# Patient Record
Sex: FEMALE | Race: WHITE | NOT HISPANIC OR LATINO | Employment: OTHER | URBAN - METROPOLITAN AREA
[De-identification: names, ages, dates, MRNs, and addresses within clinical notes are randomized per-mention and may not be internally consistent; named-entity substitution may affect disease eponyms.]

---

## 2017-04-10 ENCOUNTER — HOSPITAL ENCOUNTER (OUTPATIENT)
Dept: RADIOLOGY | Facility: HOSPITAL | Age: 62
Discharge: HOME | End: 2017-04-10
Attending: INTERNAL MEDICINE
Payer: MEDICAID

## 2017-04-10 DIAGNOSIS — Z12.31 OTHER SCREENING MAMMOGRAM: ICD-10-CM

## 2017-04-10 PROCEDURE — 77067 SCR MAMMO BI INCL CAD: CPT | Mod: 26,,, | Performed by: RADIOLOGY

## 2017-04-10 PROCEDURE — 77067 SCR MAMMO BI INCL CAD: CPT | Mod: TC

## 2017-04-25 ENCOUNTER — HOSPITAL ENCOUNTER (OUTPATIENT)
Dept: RADIOLOGY | Facility: HOSPITAL | Age: 62
Discharge: HOME | End: 2017-04-25
Attending: INTERNAL MEDICINE
Payer: MEDICAID

## 2017-04-25 DIAGNOSIS — R92.2 INCONCLUSIVE MAMMOGRAPHY: ICD-10-CM

## 2017-04-25 PROCEDURE — 77065 DX MAMMO INCL CAD UNI: CPT | Mod: 26,,, | Performed by: RADIOLOGY

## 2017-04-25 PROCEDURE — 77061 BREAST TOMOSYNTHESIS UNI: CPT | Mod: TC

## 2017-04-25 PROCEDURE — 76642 ULTRASOUND BREAST LIMITED: CPT | Mod: TC,RT

## 2017-04-25 PROCEDURE — 76642 ULTRASOUND BREAST LIMITED: CPT | Mod: 26,RT,, | Performed by: RADIOLOGY

## 2017-04-25 PROCEDURE — 77061 BREAST TOMOSYNTHESIS UNI: CPT | Mod: 26,,, | Performed by: RADIOLOGY

## 2018-06-27 ENCOUNTER — TELEPHONE (OUTPATIENT)
Dept: SURGERY | Facility: CLINIC | Age: 63
End: 2018-06-27

## 2018-06-27 NOTE — TELEPHONE ENCOUNTER
----- Message from Toya Mercedes sent at 6/26/2018  1:00 PM CDT -----  Contact: Pt:[phone]  .Needs Advice    Reason for call:Pt states she needs to schedule a one year follow up. Pt has [place] medicaid.       Communication Preference:Pt:[phone]  Additional Information:

## 2018-07-17 ENCOUNTER — OFFICE VISIT (OUTPATIENT)
Dept: HEMATOLOGY/ONCOLOGY | Facility: CLINIC | Age: 63
End: 2018-07-17
Payer: MEDICAID

## 2018-07-17 VITALS
BODY MASS INDEX: 32.19 KG/M2 | HEART RATE: 80 BPM | DIASTOLIC BLOOD PRESSURE: 82 MMHG | WEIGHT: 181.69 LBS | TEMPERATURE: 98 F | RESPIRATION RATE: 18 BRPM | SYSTOLIC BLOOD PRESSURE: 152 MMHG

## 2018-07-17 DIAGNOSIS — C20 RECTAL CANCER: ICD-10-CM

## 2018-07-17 DIAGNOSIS — R92.8 ABNORMAL MAMMOGRAM: ICD-10-CM

## 2018-07-17 DIAGNOSIS — Z85.048 PERSONAL HISTORY OF MALIGNANT NEOPLASM OF RECTUM, RECTOSIGMOID JUNCTION, AND ANUS: Primary | ICD-10-CM

## 2018-07-17 DIAGNOSIS — R92.8 ABNORMAL FINDING ON MAMMOGRAPHY: ICD-10-CM

## 2018-07-17 PROCEDURE — 99214 OFFICE O/P EST MOD 30 MIN: CPT | Mod: ,,, | Performed by: INTERNAL MEDICINE

## 2018-07-17 RX ORDER — GABAPENTIN 600 MG/1
800 TABLET ORAL 3 TIMES DAILY
COMMUNITY
End: 2019-07-09 | Stop reason: DRUGHIGH

## 2018-07-17 RX ORDER — PAROXETINE 10 MG/1
10 TABLET, FILM COATED ORAL EVERY MORNING
COMMUNITY
End: 2019-04-02

## 2018-07-17 NOTE — PROGRESS NOTES
[facility] History & Physical    Subjective:      Patient ID:   Kim Porter  62 y.o. female  1955  MD Colin Maria MD      Chief Complaint:   Rectal cancer history    HPI:  62 y.o. female with diagnosis of rectal cancer diagnosed and treated several years ago. She had radiation therapy and neoadjuvant chemotherapy initially. Thereafter she had low anterior resection with the placement of an ostomy. After 6 months approximately the ostomy was reversed. She did receive adjuvant FOLFOX chemotherapy ×6 months. She denies GI complaints at this time.    She does have peripheral neuropathy symptoms approximately [facility] up her left and right leg. This is probably secondary to previous chemotherapy, given during the treatment of her cancer.    She had a colonoscopy done in October 2017, cancer was not seen, she is due for a follow-up colonoscopy in 2019 per Dr. Larios.    She had cholecystectomy completed approximately 3 years ago and is due for a mammogram at this time.    ROS:   GEN: normal without any fever, night sweats or weight loss  HEENT: normal with no HA's, sore throat, stiff neck, changes in vision  CV: normal with no CP, SOB, PND, FLOYD or orthopnea  PULM: normal with no SOB, cough, hemoptysis, sputum or pleuritic pain  GI:   See history of present illness  : normal with no hematuria, dysuria  BREAST: normal with no mass, discharge, pain  SKIN: normal with no rash, erythema, bruising, or swelling     Past Medical History:   Diagnosis Date    Chronic kidney disease     stones    Colon cancer      Past Surgical History:   Procedure Laterality Date    HYSTERECTOMY      PORTACATH PLACEMENT  9/2012    TUBAL LIGATION         Review of patient's allergies indicates:   Allergen Reactions    Ciprofloxacin Nausea And Vomiting    Vicodin [hydrocodone-acetaminophen] Nausea And Vomiting     Social History     Social History    Marital status:      Spouse name: N/A    Number of  children: N/A    Years of education: N/A     Occupational History    Not on file.     Social History Main Topics    Smoking status: Current Every Day Smoker     Packs/day: 0.50     Years: 40.00    Smokeless tobacco: Never Used    Alcohol use No    Drug use: No    Sexual activity: Not Currently     Other Topics Concern    Not on file     Social History Narrative    No narrative on file         Current Outpatient Prescriptions:     gabapentin (NEURONTIN) 600 MG tablet, Take 600 mg by mouth 3 (three) times daily., Disp: , Rfl:     lisinopril-hydrochlorothiazide (PRINZIDE,ZESTORETIC) 20-25 mg Tab, Take 1 tablet by mouth once daily., Disp: , Rfl:     multivitamin (ONE DAILY MULTIVITAMIN) per tablet, Take 1 tablet by mouth once daily., Disp: , Rfl:     paroxetine (PAXIL) 10 MG tablet, Take 10 mg by mouth every morning., Disp: , Rfl:           Objective:   Vitals:  Blood pressure (!) 152/82, pulse 80, temperature 97.8 °F (36.6 °C), resp. rate 18, weight 82.4 kg (181 lb 11.2 oz).    Physical Examination:   GEN: no apparent distress, comfortable  HEAD: atraumatic and normocephalic  EYES: no pallor, no icterus  ENT: no pharyngeal erythema, external ears WNL; no nasal discharge  NECK: no masses, thyroid normal, trachea midline, no LAD/LN's, supple  CV: RRR with no murmur; normal pulse; normal S1 and S2; no pedal edema  CHEST: Normal respiratory effort; CTAB; normal breath sounds; no wheeze or crackles  ABDOM: nontender and nondistended; soft; normal bowel sounds; no rebound/guarding, liver and spleen not palpable  MUSC/Skeletal: ROM normal; no crepitus; joints normal; no deformities or arthropathy  EXTREM: no clubbing, cyanosis, inflammation or swelling  SKIN: no rashes, lesions, ulcers, petechiae or subcutaneous nodules  : no duff  NEURO: grossly intact; motor WNL; no tremors.  She has numbness at prison up the left and right leg  PSYCH: normal mood, affect and behavior  LYMPH: normal cervical,  supraclavicular, axillary and groin LN's  BREASTS:without palpable mass at the left or right breast    CBC, CMP, CEA, mammogram and CAT scan of abdomen and pelvis ordered at [facility]    Assessment:   (1) 62 y.o. female with diagnosis of Rectal cancer in the past, treated with neoadjuvant radiation and chemotherapy followed by surgery followed by adjuvant FOLFOX chemotherapy.    (2)she has peripheral neuropathy at the legs secondary to previous chemotherapy. She had previously been employed as a , , and worked at the local [employer]. Because of her chronic peripheral neuropathy symptoms, she cannot spend much time standing on her feet and cannot return to those occupations.    She will have the above blood work, mammogram and CAT scan of the abdomen and pelvis done at [facility] and follow-up here in one month. It approximates 5 years out since the time of her rectal cancer diagnosis.

## 2018-07-17 NOTE — LETTER
July 17, 2018      Colin Larios III, MD  [address]  [address]  [place] [address]           [facility] - Hematology Oncology  [address]  [address]  [place] [address]  Phone: [phone]  Fax: [phone]          Patient: Kim Porter   MR Number: [id number]   YOB: 1955   Date of Visit: 7/17/2018       Dear Dr. Colin Larios III:    Thank you for referring Kim Porter to me for evaluation. Attached you will find relevant portions of my assessment and plan of care.    If you have questions, please do not hesitate to call me. I look forward to following Kim Porter along with you.    Sincerely,    CAROLYN Marr MD    Enclosure  CC:  No Recipients    If you would like to receive this communication electronically, please contact [email or web address] or [phone] to request more information on [employer] Link access.    For providers and/or their staff who would like to refer a patient to [facility], please contact us through our one-stop-shop provider referral line, [facility], at [phone].    If you feel you have received this communication in error or would no longer like to receive these types of communications, please e-mail [email or web address]

## 2018-07-20 ENCOUNTER — TELEPHONE (OUTPATIENT)
Dept: HEMATOLOGY/ONCOLOGY | Facility: CLINIC | Age: 63
End: 2018-07-20

## 2018-07-20 NOTE — TELEPHONE ENCOUNTER
----- Message from Ayaka Cordova sent at 7/20/2018  9:09 AM CDT -----  Patient called in stating that she brought in disability paperwork on Tuesday and her  is requesting that she turn this in by Monday. Please contact patient when ready to be picked up at [phone]. Thanks

## 2018-07-20 NOTE — TELEPHONE ENCOUNTER
"Patient notified that Dr. Marr is out of the office until Monday he is at a Oncology Conference and the disability paperwork is not ready at this time. Informed to call Monday after 10am to see if they are completed once he comes in. She voiced understanding and stated that "wasn't a problem".  "

## 2018-07-23 ENCOUNTER — TELEPHONE (OUTPATIENT)
Dept: SURGERY | Facility: CLINIC | Age: 63
End: 2018-07-23

## 2018-07-23 ENCOUNTER — HOSPITAL ENCOUNTER (OUTPATIENT)
Dept: RADIOLOGY | Facility: HOSPITAL | Age: 63
Discharge: HOME | End: 2018-07-23
Attending: INTERNAL MEDICINE
Payer: MEDICAID

## 2018-07-23 DIAGNOSIS — C20 RECTAL CANCER: ICD-10-CM

## 2018-07-23 DIAGNOSIS — Z85.048 PERSONAL HISTORY OF MALIGNANT NEOPLASM OF RECTUM, RECTOSIGMOID JUNCTION, AND ANUS: ICD-10-CM

## 2018-07-23 PROCEDURE — 74177 CT ABD & PELVIS W/CONTRAST: CPT | Mod: TC

## 2018-07-23 PROCEDURE — 25500020 PHARM REV CODE 255: Performed by: INTERNAL MEDICINE

## 2018-07-23 PROCEDURE — 74177 CT ABD & PELVIS W/CONTRAST: CPT | Mod: 26,,, | Performed by: RADIOLOGY

## 2018-07-23 RX ADMIN — IOHEXOL 75 ML: 350 INJECTION, SOLUTION INTRAVENOUS at 11:07

## 2018-07-23 NOTE — TELEPHONE ENCOUNTER
----- Message from Jaelyn Roberson sent at 2018  8:09 AM CDT -----  Needs Advice    Reason for call:  Pt states she faxed over her disability paperwork on 18 and she failed to list her  and contact number. Pt also states she is waiting for a return call from the nurse to schedule a f/u appt.     Communication Preference: [phone]  Additional Information:  Pt has Medicaid

## 2018-07-23 NOTE — TELEPHONE ENCOUNTER
Returned call. No answer. Left message to please call back to schedule f/u and that her fax was received.It needs to be reviewed by Dr. Hinds.

## 2018-07-27 ENCOUNTER — PATIENT MESSAGE (OUTPATIENT)
Dept: SURGERY | Facility: CLINIC | Age: 63
End: 2018-07-27

## 2018-07-27 ENCOUNTER — TELEPHONE (OUTPATIENT)
Dept: HEMATOLOGY/ONCOLOGY | Facility: CLINIC | Age: 63
End: 2018-07-27

## 2018-07-27 NOTE — TELEPHONE ENCOUNTER
"----- Message from Adilene Welch sent at 7/27/2018 11:10 AM CDT -----  Pt stated she left copy of a disability paper with Dr. Marr last week and a copy with the "checkout girl" when she checked out.  She wanted to swing by and pick it up.  Not sure where it is or who has it.    CB# [phone]    Thanks  Adilene"

## 2018-08-06 ENCOUNTER — HOSPITAL ENCOUNTER (OUTPATIENT)
Dept: RADIOLOGY | Facility: HOSPITAL | Age: 63
Discharge: HOME | End: 2018-08-06
Attending: INTERNAL MEDICINE
Payer: MEDICAID

## 2018-08-06 VITALS — BODY MASS INDEX: 31.89 KG/M2 | WEIGHT: 180 LBS | HEIGHT: 63 IN

## 2018-08-06 DIAGNOSIS — Z85.048 PERSONAL HISTORY OF MALIGNANT NEOPLASM OF RECTUM, RECTOSIGMOID JUNCTION, AND ANUS: ICD-10-CM

## 2018-08-06 DIAGNOSIS — R92.8 ABNORMAL MAMMOGRAM: ICD-10-CM

## 2018-08-06 DIAGNOSIS — C20 RECTAL CANCER: ICD-10-CM

## 2018-08-06 PROCEDURE — 77066 DX MAMMO INCL CAD BI: CPT | Mod: 26,,, | Performed by: RADIOLOGY

## 2018-08-06 PROCEDURE — 77066 DX MAMMO INCL CAD BI: CPT | Mod: TC

## 2018-08-07 ENCOUNTER — OFFICE VISIT (OUTPATIENT)
Dept: HEMATOLOGY/ONCOLOGY | Facility: CLINIC | Age: 63
End: 2018-08-07
Payer: MEDICAID

## 2018-08-07 VITALS
WEIGHT: 187 LBS | DIASTOLIC BLOOD PRESSURE: 82 MMHG | TEMPERATURE: 98 F | BODY MASS INDEX: 33.13 KG/M2 | SYSTOLIC BLOOD PRESSURE: 139 MMHG | HEART RATE: 91 BPM | RESPIRATION RATE: 18 BRPM

## 2018-08-07 DIAGNOSIS — Z85.048 PERSONAL HISTORY OF MALIGNANT NEOPLASM OF RECTUM, RECTOSIGMOID JUNCTION, AND ANUS: Primary | ICD-10-CM

## 2018-08-07 DIAGNOSIS — G62.2 PERIPHERAL NEUROPATHY CAUSED BY TOXIN: ICD-10-CM

## 2018-08-07 DIAGNOSIS — R92.8 ABNORMAL FINDING ON MAMMOGRAPHY: ICD-10-CM

## 2018-08-07 PROCEDURE — 99213 OFFICE O/P EST LOW 20 MIN: CPT | Mod: ,,, | Performed by: INTERNAL MEDICINE

## 2018-08-07 RX ORDER — ROSUVASTATIN CALCIUM 10 MG/1
10 TABLET, COATED ORAL DAILY
COMMUNITY
End: 2020-04-13

## 2018-08-07 NOTE — PROGRESS NOTES
Dear Abena,                                                                              8/7/18      I am writing this letter of summary as part of appeal for disability benefits for   Kim Porter  62 y.o. female  1955  MD Colin Gomez MD       Rectal cancer history      62 y.o. female with diagnosis of rectal cancer diagnosed and treated several years ago. She had radiation therapy and neoadjuvant chemotherapy initially. Thereafter she had low anterior resection with the placement of an ostomy. After 6 months approximately the ostomy was reversed. She did receive adjuvant FOLFOX chemotherapy ×6 months to reduce the risk of cancer recurrance long term.    She denies GI complaints at this time.    She does have peripheral neuropathy symptoms approximately [facility] up her left and right leg. This is probably secondary to previous chemotherapy, given during the treatment of her cancer.  The peripheral neuropathy remains a chronic problem for her on daily.  She has constant burning sensation in the lower legs and feet.  These symptoms are increased primarily at night, by activities including prolonged standing and walking.    Medical therapy has been tried for the management of peripheral neuropathy.  She is presently on Neurontin 600mg three times daily, with continued symptoms.                        Her current physical exam is as follows:    GEN: no apparent distress, comfortable  HEAD: atraumatic and normocephalic  EYES: no pallor, no icterus  ENT: no pharyngeal erythema, external ears WNL; no nasal discharge  NECK: no masses, thyroid normal, trachea midline, no LAD/LN's, supple  CV: RRR with no murmur; normal pulse; normal S1 and S2; no pedal edema  CHEST: Normal respiratory effort; CTAB; normal breath sounds; no wheeze or crackles  ABDOM: nontender and nondistended; soft; normal bowel sounds; no rebound/guarding, liver and spleen not palpable  MUSC/Skeletal: ROM  "normal; no crepitus; joints normal; no deformities   EXTREM: no clubbing, cyanosis, inflammation or swelling  SKIN: no rashes, lesions, ulcers, petechiae   NEURO:  motor WNL; no tremors.  She has numbness at skilled nursing up the left and right leg with altered sensation,  Skin in this area has "pins and needles" sensation.  PSYCH: normal mood, affect and behavior  LYMPH: normal cervical, supraclavicular, axillary and groin LN's  BREASTS:without palpable mass at the left or right breast    CBC, CMP, CEA, mammogram and CAT scan of abdomen and pelvis at [facility] from 7/17/18 do not show evidence for recurrant or metastatic cancer.  She is in complete remission.       (1) 62 y.o. female with diagnosis of Rectal cancer in the past, treated with neoadjuvant radiation and chemotherapy followed by surgery followed by adjuvant FOLFOX chemotherapy.    (2)She has peripheral neuropathy at the legs secondary to previous chemotherapy. She had previously been employed as a , , and worked at the local [employer]. Because of her chronic peripheral neuropathy symptoms, she cannot perform her job duties standing on her feet and walking for significant times, without increasing her symptoms and her neuropathy.    (3)She can not return to similar activities, I would consider her medically disabled  secondary to residual neuropathy secondary to chemotherapy treatments for the management of her cancer condition.    I will re evaluate her symptoms and cancer status in 6 months.      Sincerely,      EVA Leal MD                   "

## 2018-08-07 NOTE — LETTER
August 7, 2018      Colin Larios III, MD  [address]  [address]  [place] [address]           [facility] - Hematology Oncology  [address]  [address]  [place] [address]  Phone: [phone]  Fax: [phone]          Patient: Kim Porter   MR Number: [id number]   YOB: 1955   Date of Visit: 8/7/2018       Dear Dr. Colin Larios III:    Thank you for referring Kim Porter to me for evaluation. Attached you will find relevant portions of my assessment and plan of care.    If you have questions, please do not hesitate to call me. I look forward to following Kim Porter along with you.    Sincerely,    CAROLYN Marr MD    Enclosure  CC:  No Recipients    If you would like to receive this communication electronically, please contact [email or web address] or [phone] to request more information on [employer] Link access.    For providers and/or their staff who would like to refer a patient to [facility], please contact us through our one-stop-shop provider referral line, [facility], at [phone].    If you feel you have received this communication in error or would no longer like to receive these types of communications, please e-mail [email or web address]

## 2019-01-25 DIAGNOSIS — R40.0 HAS DAYTIME DROWSINESS: ICD-10-CM

## 2019-01-25 DIAGNOSIS — R06.83 SNORING: Primary | ICD-10-CM

## 2019-01-25 DIAGNOSIS — G47.00 INSOMNIA: ICD-10-CM

## 2019-01-25 DIAGNOSIS — G47.9 RESTLESS SLEEPER: ICD-10-CM

## 2019-01-26 ENCOUNTER — PROCEDURE VISIT (OUTPATIENT)
Dept: SLEEP MEDICINE | Facility: HOSPITAL | Age: 64
End: 2019-01-26
Attending: PSYCHIATRY & NEUROLOGY
Payer: MEDICAID

## 2019-01-26 DIAGNOSIS — G47.00 INSOMNIA: ICD-10-CM

## 2019-01-26 DIAGNOSIS — R06.83 SNORING: ICD-10-CM

## 2019-01-26 DIAGNOSIS — G47.9 RESTLESS SLEEPER: ICD-10-CM

## 2019-01-26 DIAGNOSIS — R40.0 HAS DAYTIME DROWSINESS: ICD-10-CM

## 2019-01-26 PROCEDURE — 95810 POLYSOM 6/> YRS 4/> PARAM: CPT

## 2019-02-04 ENCOUNTER — PROCEDURE VISIT (OUTPATIENT)
Dept: SLEEP MEDICINE | Facility: HOSPITAL | Age: 64
End: 2019-02-04
Attending: PSYCHIATRY & NEUROLOGY

## 2019-02-04 DIAGNOSIS — R06.83 SNORING: Primary | ICD-10-CM

## 2019-02-04 DIAGNOSIS — R06.83 SNORING: ICD-10-CM

## 2019-02-04 DIAGNOSIS — G47.00 INSOMNIA: ICD-10-CM

## 2019-02-04 DIAGNOSIS — G47.9 RESTLESS SLEEPER: ICD-10-CM

## 2019-02-04 DIAGNOSIS — R40.0 HAS DAYTIME DROWSINESS: ICD-10-CM

## 2019-02-04 PROCEDURE — 95811 POLYSOM 6/>YRS CPAP 4/> PARM: CPT

## 2019-02-14 ENCOUNTER — TELEPHONE (OUTPATIENT)
Dept: HEMATOLOGY/ONCOLOGY | Facility: CLINIC | Age: 64
End: 2019-02-14

## 2019-04-01 DIAGNOSIS — M79.672 BILATERAL FOOT PAIN: Primary | ICD-10-CM

## 2019-04-01 DIAGNOSIS — M79.671 BILATERAL FOOT PAIN: Primary | ICD-10-CM

## 2019-04-02 ENCOUNTER — HOSPITAL ENCOUNTER (OUTPATIENT)
Dept: RADIOLOGY | Facility: HOSPITAL | Age: 64
Discharge: HOME | End: 2019-04-02
Attending: ORTHOPAEDIC SURGERY
Payer: MEDICARE

## 2019-04-02 ENCOUNTER — OFFICE VISIT (OUTPATIENT)
Dept: ORTHOPEDICS | Facility: CLINIC | Age: 64
End: 2019-04-02
Payer: MEDICARE

## 2019-04-02 VITALS — HEIGHT: 63 IN | RESPIRATION RATE: 20 BRPM | WEIGHT: 186.06 LBS | BODY MASS INDEX: 32.97 KG/M2

## 2019-04-02 DIAGNOSIS — M79.671 FOOT PAIN, BILATERAL: Primary | ICD-10-CM

## 2019-04-02 DIAGNOSIS — M79.672 FOOT PAIN, BILATERAL: Primary | ICD-10-CM

## 2019-04-02 DIAGNOSIS — M79.672 BILATERAL FOOT PAIN: ICD-10-CM

## 2019-04-02 DIAGNOSIS — M79.671 BILATERAL FOOT PAIN: ICD-10-CM

## 2019-04-02 PROCEDURE — 99999 PR PBB SHADOW E&M-EST. PATIENT-LVL III: CPT | Mod: PBBFAC,,, | Performed by: ORTHOPAEDIC SURGERY

## 2019-04-02 PROCEDURE — 99203 PR OFFICE/OUTPT VISIT, NEW, LEVL III, 30-44 MIN: ICD-10-PCS | Mod: S$GLB,,, | Performed by: ORTHOPAEDIC SURGERY

## 2019-04-02 PROCEDURE — 3008F BODY MASS INDEX DOCD: CPT | Mod: CPTII,S$GLB,, | Performed by: ORTHOPAEDIC SURGERY

## 2019-04-02 PROCEDURE — 73630 X-RAY EXAM OF FOOT: CPT | Mod: 50,TC,PN

## 2019-04-02 PROCEDURE — 99999 PR PBB SHADOW E&M-EST. PATIENT-LVL III: ICD-10-PCS | Mod: PBBFAC,,, | Performed by: ORTHOPAEDIC SURGERY

## 2019-04-02 PROCEDURE — 3008F PR BODY MASS INDEX (BMI) DOCUMENTED: ICD-10-PCS | Mod: CPTII,S$GLB,, | Performed by: ORTHOPAEDIC SURGERY

## 2019-04-02 PROCEDURE — 73630 X-RAY EXAM OF FOOT: CPT | Mod: 26,50,, | Performed by: RADIOLOGY

## 2019-04-02 PROCEDURE — 73630 XR FOOT COMPLETE 3 VIEW BILATERAL: ICD-10-PCS | Mod: 26,50,, | Performed by: RADIOLOGY

## 2019-04-02 PROCEDURE — 99203 OFFICE O/P NEW LOW 30 MIN: CPT | Mod: S$GLB,,, | Performed by: ORTHOPAEDIC SURGERY

## 2019-04-02 RX ORDER — OXYBUTYNIN CHLORIDE 5 MG/1
TABLET, EXTENDED RELEASE ORAL
Refills: 4 | COMMUNITY
Start: 2019-03-19 | End: 2020-07-21

## 2019-04-02 RX ORDER — TRETINOIN 0.5 MG/G
CREAM TOPICAL
Refills: 1 | COMMUNITY
Start: 2019-02-12 | End: 2020-04-13

## 2019-04-02 RX ORDER — VENLAFAXINE HYDROCHLORIDE 75 MG/1
CAPSULE, EXTENDED RELEASE ORAL
Refills: 3 | COMMUNITY
Start: 2019-03-19 | End: 2020-07-21

## 2019-04-02 RX ORDER — OMEPRAZOLE 40 MG/1
CAPSULE, DELAYED RELEASE ORAL
Refills: 3 | COMMUNITY
Start: 2019-03-08

## 2019-04-02 RX ORDER — MELOXICAM 15 MG/1
TABLET ORAL
Refills: 3 | COMMUNITY
Start: 2019-03-08 | End: 2019-06-13 | Stop reason: ALTCHOICE

## 2019-04-03 NOTE — PROGRESS NOTES
Past Medical History:   Diagnosis Date    Chronic kidney disease     stones    Colon cancer        Past Surgical History:   Procedure Laterality Date    APPLICATION, GRAFT  1/9/2013    [facility] at [facility]    CLOSURE-ILEOSTOMY N/A 9/26/2013    Performed [facility] at [facility]    COLECTOMY, LOW ANTERIOR  1/9/2013    [facility]    COLONOSCOPY N/A 1/9/2013    Performed [facility] at [facility]    CREATION, ILEOSTOMY  1/9/2013    [facility]    HYSTERECTOMY      HYSTERECTOMY, TOTAL, ABDOMINAL  1/9/2013    [facility]    OOPHORECTOMY      PORTACATH PLACEMENT  9/2012    RESECTION, RECTUM, LOW ANTERIOR, LAPAROSCOPIC, WITH SIGMOID COLECTOMY N/A 1/9/2013    Performed by [facility] at [facility]    SALPINGO-OOPHORECTOMY, BILATERAL  1/9/2013    [facility]    SIGMOIDOSCOPY, FLEXIBLE  1/9/2013    [facility]    TUBAL LIGATION         Current Outpatient Medications   Medication Sig    gabapentin (NEURONTIN) 600 MG tablet Take 800 mg by mouth 3 (three) times daily.     lisinopril-hydrochlorothiazide (PRINZIDE,ZESTORETIC) 20-25 mg Tab Take 1 tablet by mouth once daily.    meloxicam (MOBIC) 15 MG tablet TK 1 T PO D  FOR ARTHRITIS    multivitamin (ONE DAILY MULTIVITAMIN) per tablet Take 1 tablet by mouth once daily.    omeprazole (PRILOSEC) 40 MG capsule TK 1 C PO ONCE D    oxybutynin (DITROPAN-XL) 5 MG TR24 TK 1 T PO D    rosuvastatin (CRESTOR) 10 MG tablet Take 10 mg by mouth once daily.    tretinoin (RETIN-A) 0.05 % cream JEANNA EXT AA QD HS    venlafaxine (EFFEXOR-XR) 75 MG 24 hr capsule TK 1 C PO D     No current facility-administered medications for this visit.        Review of patient's allergies indicates:   Allergen  Reactions    Ciprofloxacin Nausea And Vomiting    Vicodin [hydrocodone-acetaminophen] Nausea And Vomiting       Family History   Problem Relation Age of Onset    Ovarian cancer Sister 57    Cancer Sister     Cancer Mother     Breast cancer Mother     Stroke Father     Lung disease Father        Social History     Socioeconomic History    Marital status:      Spouse name: Not on file    Number of children: Not on file    Years of education: Not on file    Highest education level: Not on file   Occupational History    Not on file   Social Needs    Financial resource strain: Not on file    Food insecurity:     Worry: Not on file     Inability: Not on file    Transportation needs:     Medical: Not on file     Non-medical: Not on file   Tobacco Use    Smoking status: Current Every Day Smoker     Packs/day: 0.50     Years: 40.00     Pack years: 20.00    Smokeless tobacco: Never Used   Substance and Sexual Activity    Alcohol use: No    Drug use: No    Sexual activity: Not Currently   Lifestyle    Physical activity:     Days per week: Not on file     Minutes per session: Not on file    Stress: Not on file   Relationships    Social connections:     Talks on phone: Not on file     Gets together: Not on file     Attends [employer] service: Not on file     Active member of club or organization: Not on file     Attends meetings of clubs or organizations: Not on file     Relationship status: Not on file   Other Topics Concern    Not on file   Social History Narrative    Not on file       Chief Complaint:   Chief Complaint   Patient presents with    Right Foot - Pain    Left Foot - Pain       Consulting Physician: Self, Aaareferral    History of present illness:    This is a 63 y.o. female who complains of chronic moderate bilateral foot pain. She has a history of recurrent stress fractures. She puts her pain at a 410 and worse with activities.    Review of Systems:    Constitution: Denies  "chills, fever, and sweats.  HENT: Denies headaches or blurry vision.  Cardiovascular: Denies chest pain or irregular heart beat.  Respiratory: Denies cough or shortness of breath.  Gastrointestinal: Denies abdominal pain, nausea, or vomiting.  Musculoskeletal:  Denies muscle cramps.  Neurological: Denies dizziness or focal weakness.  Psychiatric/Behavioral: Normal mental status.  Hematologic/Lymphatic: Denies bleeding problem or easy bruising/bleeding.  Skin: Denies rash or suspicious lesions.    Examination:    Vital Signs:    Vitals:    04/02/19 1259   Resp: 20   Weight: 84.4 kg (186 lb 1.1 oz)   Height: 5' 3" (1.6 m)   PainSc:   4   PainLoc: Foot       Body mass index is 32.96 kg/m².    This a well-developed, well nourished patient in no acute distress.    Alert and oriented x 3 and cooperative to examination.       Physical Exam: Left Foot Exam     Gait:   Normal    Skin  Rash:   None  Scars:   None    Inspection   Deformity:   None  Erythema:   None  Bruising:   None  Swelling:   None  Masses:  None  Lymphadenopathy: None    Range of Motion   Ankle Joint   Normal  Subtalar Joint   Normal  Toes:   Normal    Muscle Strength   Ankle:   Normal  Toes:   Normal    Other   Tenderness:  None  Instability:  None  Ankle Crepitus:  None  Sensation:   Normal  Achilles:  Normal  Forefoot:  Normal    Vascular Exam   Dorsalis Pedis:       Palpable  Capillary refill:     Normal      Right Foot Exam     Gait:   Normal    Skin  Rash:   None  Scars:   None    Inspection   Deformity:   None  Erythema:   None  Bruising:   None  Swelling:   None  Masses:  None  Lymphadenopathy: None    Range of Motion   Ankle Joint   Normal  Subtalar Joint   Normal  Toes:   Normal    Muscle Strength   Ankle:   Normal  Toes:   Normal    Other   Tenderness:  None  Instability:  None  Ankle Crepitus:  None  Sensation:   Normal  Achilles:  Normal  Forefoot:  Normal    Vascular Exam   Dorsalis Pedis:       Palpable  Capillary refill:    "  Normal          Imaging: X-rays ordered and images interpreted today personally by me of both feet show flat foot deformity along with evidence of what appears to be old healed stress fractures.        Assessment: Foot pain, bilateral        Plan:  We discussed treatment options and recommended custom orthotics.  We gave her a prescription for this today.      DISCLAIMER: This note may have been dictated using voice recognition software and may contain grammatical errors.     NOTE: Consult report sent to referring provider via [employer] EMR.

## 2019-04-15 ENCOUNTER — OFFICE VISIT (OUTPATIENT)
Dept: HEMATOLOGY/ONCOLOGY | Facility: CLINIC | Age: 64
End: 2019-04-15
Payer: MEDICARE

## 2019-04-15 VITALS
RESPIRATION RATE: 20 BRPM | TEMPERATURE: 98 F | DIASTOLIC BLOOD PRESSURE: 91 MMHG | HEART RATE: 77 BPM | SYSTOLIC BLOOD PRESSURE: 151 MMHG | BODY MASS INDEX: 30.81 KG/M2 | WEIGHT: 173.88 LBS

## 2019-04-15 DIAGNOSIS — Z85.048 PERSONAL HISTORY OF MALIGNANT NEOPLASM OF RECTUM, RECTOSIGMOID JUNCTION, AND ANUS: Primary | ICD-10-CM

## 2019-04-15 DIAGNOSIS — G62.2 PERIPHERAL NEUROPATHY CAUSED BY TOXIN: ICD-10-CM

## 2019-04-15 PROCEDURE — 3008F PR BODY MASS INDEX (BMI) DOCUMENTED: ICD-10-PCS | Mod: ,,, | Performed by: INTERNAL MEDICINE

## 2019-04-15 PROCEDURE — 3008F BODY MASS INDEX DOCD: CPT | Mod: ,,, | Performed by: INTERNAL MEDICINE

## 2019-04-15 PROCEDURE — 99214 OFFICE O/P EST MOD 30 MIN: CPT | Mod: ,,, | Performed by: INTERNAL MEDICINE

## 2019-04-15 PROCEDURE — 99214 PR OFFICE/OUTPT VISIT, EST, LEVL IV, 30-39 MIN: ICD-10-PCS | Mod: ,,, | Performed by: INTERNAL MEDICINE

## 2019-04-15 NOTE — LETTER
April 21, 2019      Luann Clark, [facility]  [address]  [place] [address]           [facility] - Hematology Oncology  [address]  [address] [place] [address]  Phone: [phone]  Fax: [phone]          Patient: Kim Porter   MR Number: [id number]   YOB: 1955   Date of Visit: 4/15/2019       Dear Luann Clark:    Thank you for referring Kim Porter to me for evaluation. Attached you will find relevant portions of my assessment and plan of care.    If you have questions, please do not hesitate to call me. I look forward to following Kim Porter along with you.    Sincerely,    CAROLYN Marr MD    Enclosure  CC:  No Recipients    If you would like to receive this communication electronically, please contact [email or web address] or [phone] to request more information on [employer] Link access.    For providers and/or their staff who would like to refer a patient to [facility], please contact us through our one-stop-shop provider referral line, [facility], at [phone].    If you feel you have received this communication in error or would no longer like to receive these types of communications, please e-mail [email or web address]

## 2019-04-21 NOTE — PROGRESS NOTES
Progress Note       Kim Porter  63 y.o. female  1955  MD Colin Gomez MD       Rectal cancer history      63 y.o. female with diagnosis of rectal cancer diagnosed and treated several years ago. She had radiation therapy and neoadjuvant chemotherapy initially. Thereafter she had low anterior resection with the placement of an ostomy. After 6 months approximately the ostomy was reversed. She did receive adjuvant FOLFOX chemotherapy ×6 months to reduce the risk of cancer recurrance long term.    She denies GI complaints at this time.    She does have peripheral neuropathy symptoms approximately [facility] up her left and right leg. This is probably secondary to previous chemotherapy, given during the treatment of her cancer.  The peripheral neuropathy remains a chronic problem for her on daily.  She has constant burning sensation in the lower legs and feet.  These symptoms are increased primarily at night, by activities including prolonged standing and walking.    Medical therapy has been tried for the management of peripheral neuropathy.  She is presently on Neurontin 600mg three times daily, with continued symptoms.                        Her current physical exam is as follows:    GEN: no apparent distress, comfortable  HEAD: atraumatic and normocephalic  EYES: no pallor, no icterus  ENT: no pharyngeal erythema, external ears WNL; no nasal discharge  NECK: no masses, thyroid normal, trachea midline, no LAD/LN's, supple  CV: RRR with no murmur; normal pulse; normal S1 and S2; no pedal edema  CHEST: Normal respiratory effort; CTAB; normal breath sounds; no wheeze or crackles  ABDOM: nontender and nondistended; soft;  no rebound/guarding, liver and spleen not palpable  MUSC/Skeletal: ROM normal; no crepitus; joints normal; no deformities   EXTREM: no clubbing, cyanosis, inflammation or swelling  SKIN: no rashes, lesions, ulcers, petechiae   NEURO:  motor WNL; no tremors.  She  "has numbness at skilled nursing up the left and right leg with altered sensation,  Skin in this area has "pins and needles" sensation.  PSYCH: normal mood, affect and behavior  LYMPH: normal cervical, supraclavicular, axillary and groin LN's  BREASTS:without palpable mass at the left or right breast    CBC, CMP, CEA, mammogram and CAT scan of abdomen and pelvis at [facility] from 7/17/18 do not show evidence for recurrant or metastatic cancer.  She is in complete remission.       (1) 63 y.o. female with diagnosis of Rectal cancer in the past, treated with neoadjuvant radiation and chemotherapy followed by surgery followed by adjuvant FOLFOX chemotherapy.    (2)She has peripheral neuropathy at the legs secondary to previous chemotherapy.     I will re evaluate her symptoms and cancer status in 6 months.      EVA Leal MD                   "

## 2019-06-13 ENCOUNTER — OFFICE VISIT (OUTPATIENT)
Dept: PODIATRY | Facility: CLINIC | Age: 64
End: 2019-06-13
Payer: MEDICARE

## 2019-06-13 VITALS
HEIGHT: 63 IN | SYSTOLIC BLOOD PRESSURE: 179 MMHG | TEMPERATURE: 98 F | HEART RATE: 84 BPM | DIASTOLIC BLOOD PRESSURE: 90 MMHG | WEIGHT: 170 LBS | BODY MASS INDEX: 30.12 KG/M2

## 2019-06-13 DIAGNOSIS — M19.079 OSTEOARTHRITIS OF ANKLE AND FOOT, UNSPECIFIED LATERALITY: ICD-10-CM

## 2019-06-13 DIAGNOSIS — M72.2 PLANTAR FASCIITIS: Primary | ICD-10-CM

## 2019-06-13 PROCEDURE — 3008F BODY MASS INDEX DOCD: CPT | Mod: CPTII,S$GLB,, | Performed by: PODIATRIST

## 2019-06-13 PROCEDURE — 99203 OFFICE O/P NEW LOW 30 MIN: CPT | Mod: S$GLB,,, | Performed by: PODIATRIST

## 2019-06-13 PROCEDURE — 3008F PR BODY MASS INDEX (BMI) DOCUMENTED: ICD-10-PCS | Mod: CPTII,S$GLB,, | Performed by: PODIATRIST

## 2019-06-13 PROCEDURE — 99999 PR PBB SHADOW E&M-EST. PATIENT-LVL III: ICD-10-PCS | Mod: PBBFAC,,, | Performed by: PODIATRIST

## 2019-06-13 PROCEDURE — 99203 PR OFFICE/OUTPT VISIT, NEW, LEVL III, 30-44 MIN: ICD-10-PCS | Mod: S$GLB,,, | Performed by: PODIATRIST

## 2019-06-13 PROCEDURE — 99999 PR PBB SHADOW E&M-EST. PATIENT-LVL III: CPT | Mod: PBBFAC,,, | Performed by: PODIATRIST

## 2019-06-13 RX ORDER — DICLOFENAC SODIUM 75 MG/1
75 TABLET, DELAYED RELEASE ORAL 2 TIMES DAILY
Qty: 60 TABLET | Refills: 1 | Status: SHIPPED | OUTPATIENT
Start: 2019-06-13 | End: 2019-06-13 | Stop reason: SDUPTHER

## 2019-06-13 RX ORDER — ALPRAZOLAM 0.5 MG/1
TABLET ORAL
Refills: 0 | COMMUNITY
Start: 2019-05-23 | End: 2020-08-10 | Stop reason: SDUPTHER

## 2019-06-13 RX ORDER — DICLOFENAC SODIUM 75 MG/1
TABLET, DELAYED RELEASE ORAL
Qty: 180 TABLET | Refills: 1 | Status: SHIPPED | OUTPATIENT
Start: 2019-06-13 | End: 2020-04-13

## 2019-06-13 RX ORDER — GABAPENTIN 800 MG/1
TABLET ORAL
Refills: 3 | COMMUNITY
Start: 2019-05-15 | End: 2020-04-13

## 2019-06-13 RX ORDER — LOSARTAN POTASSIUM 25 MG/1
TABLET ORAL
Refills: 1 | COMMUNITY
Start: 2019-05-15

## 2019-06-13 RX ORDER — TIZANIDINE 4 MG/1
TABLET ORAL
Refills: 1 | COMMUNITY
Start: 2019-05-13 | End: 2020-04-13

## 2019-06-16 NOTE — PROGRESS NOTES
Subjective:       Patient ID: Kim Porter is a 63 y.o. female.    Chief Complaint: Heel Pain and Foot Pain    HPI patient presents today she complains of bilateral foot pain on the top of her feet and right heel pain she states that she had x-rays taken with [facility] about a month ago she relates the pain came on gradually over the past 6 months and is especially bad by the end of the day her right foot is equally is painful to the left.  Patient does have a history of stress fractures.  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  Review of Systems   Musculoskeletal: Positive for arthralgias, gait problem and joint swelling.   Neurological: Positive for numbness.   All other systems reviewed and are negative.      Objective:      Physical Exam   Constitutional: She appears well-developed and well-nourished.   Cardiovascular:   Pulses:       Dorsalis pedis pulses are 1+ on the right side, and 1+ on the left side.        Posterior tibial pulses are 1+ on the right side, and 1+ on the left side.   Pulmonary/Chest: Effort normal.   Musculoskeletal: Normal range of motion. She exhibits edema and tenderness.        Right foot: There is deformity.        Left foot: There is deformity.   Feet:   Right Foot:   Protective Sensation: 4 sites tested. 2 sites sensed.   Skin Integrity: Positive for erythema and warmth.   Left Foot:   Protective Sensation: 4 sites tested. 2 sites sensed.   Skin Integrity: Positive for erythema and warmth.   Neurological: She is alert. She displays abnormal reflex.   Skin: Skin is warm. Capillary refill takes more than 3 seconds.   Psychiatric: She has a normal mood and affect. Her behavior is normal. Judgment and thought content normal.   Nursing note and vitals reviewed.      Assessment:       1. Plantar fasciitis    2. Osteoarthritis of ankle and foot, unspecified laterality        Plan:       Patient presents today she relates bilateral foot pain primarily in the tops  of both feet and then she has right heel pain she states this has gotten gradually worse over the past 6 months she previously had x-rays performed at [facility] she does have a history of stress fracture she states that she does have neuropathy related to chemotherapy for treatment of colon cancer.  Patient states she had previously seen Dr. Smiley Cuba who put a hard plate in the bottom of her shoes this did not help her and did not give her any relief.  Patient's x-rays were evaluated patient has diffuse degenerative changes she has bunion deformities and extensive spurring noted I did discuss with the patient plantar fasciitis and degenerative arthritis I am going to start the patient on diclofenac instead of the Mobic that she has been taking she is already taking high doses of gabapentin for her neuropathy.  Patient will discontinue all barefoot walking I have dispensed the patient blue arch pads to place in her shoes I have given her extra ones advising her this is going to help with both the pain on the tops of her feet and her right heel pain I plan to see her for follow-up in 3 weeks if she has any problems questions or concerns prior to that time she is to contact me.  Patient advised not getting enough support is putting excessive stress on the midfoot which is contributing to the pain and the degenerative arthritis.  Total face-to-face time equaled 35 min.

## 2019-07-09 ENCOUNTER — OFFICE VISIT (OUTPATIENT)
Dept: PODIATRY | Facility: CLINIC | Age: 64
End: 2019-07-09
Payer: MEDICARE

## 2019-07-09 VITALS
WEIGHT: 170 LBS | BODY MASS INDEX: 30.12 KG/M2 | HEART RATE: 86 BPM | DIASTOLIC BLOOD PRESSURE: 76 MMHG | HEIGHT: 63 IN | SYSTOLIC BLOOD PRESSURE: 158 MMHG | TEMPERATURE: 98 F

## 2019-07-09 DIAGNOSIS — M19.079 OSTEOARTHRITIS OF ANKLE AND FOOT, UNSPECIFIED LATERALITY: ICD-10-CM

## 2019-07-09 DIAGNOSIS — M72.2 PLANTAR FASCIITIS: Primary | ICD-10-CM

## 2019-07-09 PROCEDURE — 3008F PR BODY MASS INDEX (BMI) DOCUMENTED: ICD-10-PCS | Mod: CPTII,S$GLB,, | Performed by: PODIATRIST

## 2019-07-09 PROCEDURE — 99213 OFFICE O/P EST LOW 20 MIN: CPT | Mod: S$GLB,,, | Performed by: PODIATRIST

## 2019-07-09 PROCEDURE — 99999 PR PBB SHADOW E&M-EST. PATIENT-LVL III: CPT | Mod: PBBFAC,,, | Performed by: PODIATRIST

## 2019-07-09 PROCEDURE — 3008F BODY MASS INDEX DOCD: CPT | Mod: CPTII,S$GLB,, | Performed by: PODIATRIST

## 2019-07-09 PROCEDURE — 99999 PR PBB SHADOW E&M-EST. PATIENT-LVL III: ICD-10-PCS | Mod: PBBFAC,,, | Performed by: PODIATRIST

## 2019-07-09 PROCEDURE — 99213 PR OFFICE/OUTPT VISIT, EST, LEVL III, 20-29 MIN: ICD-10-PCS | Mod: S$GLB,,, | Performed by: PODIATRIST

## 2019-07-09 NOTE — LETTER
July 13, 2019      Luann Clark, [facility]  [address]  [address]  [place] [address]           [facility] - Podiatry/Wound Care  [address] [place] [address]  Phone: [phone]  Fax: [phone]          Patient: Kim Porter   MR Number: [id number]   YOB: 1955   Date of Visit: 7/9/2019       Dear Luann Clark:    Thank you for referring Kim Porter to me for evaluation. Attached you will find relevant portions of my assessment and plan of care.    If you have questions, please do not hesitate to call me. I look forward to following Kim Porter along with you.    Sincerely,    Romaine Muhammad, DPYONIS    Enclosure  CC:  No Recipients    If you would like to receive this communication electronically, please contact [email or web address] or [phone] to request more information on [employer] Link access.    For providers and/or their staff who would like to refer a patient to [facility], please contact us through our one-stop-shop provider referral line, [facility], at [phone].    If you feel you have received this communication in error or would no longer like to receive these types of communications, please e-mail [email or web address]

## 2019-07-14 NOTE — PROGRESS NOTES
Subjective:       Patient ID: Kim Porter is a 63 y.o. female.    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  Patient is being seen for follow-up plantar fasciitis bilateral.  Foot Pain   Associated symptoms include arthralgias, joint swelling and numbness.      Review of Systems   Musculoskeletal: Positive for arthralgias, gait problem and joint swelling.   Neurological: Positive for numbness.   All other systems reviewed and are negative.      Objective:      Physical Exam   Constitutional: She appears well-developed and well-nourished.   Cardiovascular:   Pulses:       Dorsalis pedis pulses are 1+ on the right side, and 1+ on the left side.        Posterior tibial pulses are 1+ on the right side, and 1+ on the left side.   Pulmonary/Chest: Effort normal.   Musculoskeletal: Normal range of motion. She exhibits edema and tenderness.        Right foot: There is deformity.        Left foot: There is deformity.   Feet:   Right Foot:   Protective Sensation: 4 sites tested. 2 sites sensed.   Skin Integrity: Positive for erythema and warmth.   Left Foot:   Protective Sensation: 4 sites tested. 2 sites sensed.   Skin Integrity: Positive for erythema and warmth.   Neurological: She is alert. She displays abnormal reflex.   Skin: Skin is warm. Capillary refill takes more than 3 seconds.   Psychiatric: She has a normal mood and affect. Her behavior is normal. Judgment and thought content normal.   Nursing note and vitals reviewed.      Assessment:       1. Plantar fasciitis    2. Osteoarthritis of ankle and foot, unspecified laterality        Plan:       Patient presents today for follow-up bilateral plantar fasciitis she states her left foot is completely resolved her right foot is about 70% better.  Patient is tolerating the diclofenac well I have recommended continuing to take this for the next 2 weeks.  Patient is tolerating the  additional arch pads I provided her however I am recommending increasing the amount of support and the size of the arch pad on the patient's right shoe I put a much larger pad in the right shoe I want to see how the patient tolerates this will leave the smaller pad in the left shoe since she is completely pain free in this area I have dispensed additional pads to her I plan to follow up with her as needed I did advise the patient I would recommend continuing the diclofenac x2 weeks then she can discontinue it.  Total face-to-face time equaled 15 min.    This note was created using [employer] voice recognition software that occasionally misinterpreted phrases or words.

## 2019-09-26 ENCOUNTER — HOSPITAL ENCOUNTER (EMERGENCY)
Facility: HOSPITAL | Age: 64
Discharge: HOME | End: 2019-09-26
Attending: EMERGENCY MEDICINE
Payer: MEDICARE

## 2019-09-26 VITALS
SYSTOLIC BLOOD PRESSURE: 153 MMHG | WEIGHT: 167 LBS | BODY MASS INDEX: 29.59 KG/M2 | RESPIRATION RATE: 18 BRPM | HEART RATE: 89 BPM | DIASTOLIC BLOOD PRESSURE: 67 MMHG | HEIGHT: 63 IN | TEMPERATURE: 99 F | OXYGEN SATURATION: 97 %

## 2019-09-26 DIAGNOSIS — R04.0 ACUTE ANTERIOR EPISTAXIS: Primary | ICD-10-CM

## 2019-09-26 PROCEDURE — 25000003 PHARM REV CODE 250: Performed by: EMERGENCY MEDICINE

## 2019-09-26 PROCEDURE — 99282 EMERGENCY DEPT VISIT SF MDM: CPT

## 2019-09-26 RX ORDER — OXYMETAZOLINE HCL 0.05 %
1 SPRAY, NON-AEROSOL (ML) NASAL
Status: DISCONTINUED | OUTPATIENT
Start: 2019-09-26 | End: 2019-09-26

## 2019-09-26 RX ADMIN — Medication 1 SPRAY: at 09:09

## 2019-09-26 NOTE — ED NOTES
Pt here with c/o epistaxis since this am. States she has had 2-3 nose bleeds within the last week. Bleeding controlled at this time. Pt AAO.

## 2019-10-11 NOTE — ED PROVIDER NOTES
Encounter Date: 9/26/2019       History     Chief Complaint   Patient presents with    Epistaxis     62yo female with pmh CKD and distant history of colon cancer s/p treatment presents to ED for evaluaiton of intermittent epistaxis since this morning. States she has had 2-3 nose bleeds within the last week due to allergies. Bleeding controlled at this time with light pressure.         Review of patient's allergies indicates:   Allergen Reactions    Ciprofloxacin Nausea And Vomiting    Vicodin [hydrocodone-acetaminophen] Nausea And Vomiting     Past Medical History:   Diagnosis Date    Chronic kidney disease     stones    Colon cancer      Past Surgical History:   Procedure Laterality Date    CHOLECYSTECTOMY      HYSTERECTOMY      OOPHORECTOMY      PORTACATH PLACEMENT  9/2012    TUBAL LIGATION       Family History   Problem Relation Age of Onset    Ovarian cancer Sister 57    Cancer Sister     Cancer Mother     Breast cancer Mother     Stroke Father     Lung disease Father      Social History     Tobacco Use    Smoking status: Current Every Day Smoker     Packs/day: 1.00     Years: 40.00     Pack years: 40.00     Types: Cigarettes    Smokeless tobacco: Never Used   Substance Use Topics    Alcohol use: No    Drug use: No     Review of Systems   Constitutional: Negative for appetite change, chills, diaphoresis, fatigue and fever.   HENT: Positive for nosebleeds. Negative for congestion, ear pain, rhinorrhea, sinus pressure, sinus pain, sore throat and tinnitus.    Eyes: Negative for photophobia and visual disturbance.   Respiratory: Negative for cough, chest tightness, shortness of breath and wheezing.    Cardiovascular: Negative for chest pain, palpitations and leg swelling.   Gastrointestinal: Negative for abdominal pain, constipation, diarrhea, nausea and vomiting.   Endocrine: Negative for cold intolerance, heat intolerance, polydipsia, polyphagia and polyuria.   Genitourinary: Negative for  decreased urine volume, difficulty urinating, dysuria, flank pain, frequency, hematuria and urgency.   Musculoskeletal: Negative for arthralgias, back pain, gait problem, joint swelling, myalgias, neck pain and neck stiffness.   Skin: Negative for color change, pallor, rash and wound.   Allergic/Immunologic: Negative for immunocompromised state.   Neurological: Negative for dizziness, syncope, weakness, light-headedness, numbness and headaches.   Hematological: Negative for adenopathy. Does not bruise/bleed easily.   Psychiatric/Behavioral: Negative for decreased concentration, dysphoric mood and sleep disturbance. The patient is not nervous/anxious.    All other systems reviewed and are negative.      Physical Exam     Initial Vitals [09/26/19 0859]   BP Pulse Resp Temp SpO2   (!) 169/102 89 18 98.7 °F (37.1 °C) 97 %      MAP       --         Physical Exam    Nursing note and vitals reviewed.  Constitutional: She appears well-developed and well-nourished. She is not diaphoretic. No distress.   HENT:   Head: Normocephalic and atraumatic.   Right Ear: External ear normal.   Left Ear: External ear normal.   Nose: Mucosal edema present. No nasal deformity, septal deviation or nasal septal hematoma. Epistaxis is observed.   Mouth/Throat: Oropharynx is clear and moist.   Eyes: Conjunctivae are normal. Pupils are equal, round, and reactive to light. No scleral icterus.   Neck: Normal range of motion. Neck supple. No JVD present.   Cardiovascular: Normal rate, regular rhythm, normal heart sounds and intact distal pulses.   Pulmonary/Chest: Breath sounds normal. No respiratory distress. She has no wheezes. She has no rhonchi. She has no rales. She exhibits no tenderness.   Abdominal: Soft. Bowel sounds are normal. She exhibits no distension. There is no tenderness. There is no rebound and no guarding.   Musculoskeletal: Normal range of motion. She exhibits no edema or tenderness.   Lymphadenopathy:     She has no cervical  adenopathy.   Neurological: She is alert and oriented to person, place, and time. GCS score is 15. GCS eye subscore is 4. GCS verbal subscore is 5. GCS motor subscore is 6.   Skin: Skin is warm and dry. Capillary refill takes less than 2 seconds. No rash noted. No erythema. No pallor.   Psychiatric: She has a normal mood and affect. Her behavior is normal. Judgment and thought content normal.         ED Course   Procedures  Labs Reviewed - No data to display       Imaging Results    None          Medical Decision Making:   Differential Diagnosis:   Epistaxis, nasal polyps  ED Management:  Neosynephrine applied via rolled 4x4 with complete resolution of epistaxis.                       Clinical Impression:       ICD-10-CM ICD-9-CM   1. Acute anterior epistaxis R04.0 784.7         Disposition:   Disposition: Discharged  Condition: Stable                        Maisha Hough MD  10/11/19 8789

## 2019-12-11 ENCOUNTER — TELEPHONE (OUTPATIENT)
Dept: PODIATRY | Facility: CLINIC | Age: 64
End: 2019-12-11

## 2019-12-11 NOTE — TELEPHONE ENCOUNTER
----- Message from Andrews Rollins sent at 12/11/2019  4:15 PM CST -----  Contact: pt  Type:  RX Refill Request    Who Called:  pt  Refill or New Rx:  frefill  RX Name and Strength:  diclofenac (VOLTAREN) 75 MG EC tablet  How is the patient currently taking it? (ex. 1XDay):  2 x day  Is this a 30 day or 90 day RX:  90  Preferred Pharmacy with phone number:    [id number] [place] [id number] [address] [facility] [address]  [address] [place] [address]  Phone: [phone] Fax: [phone]    Local or Mail Order:    Ordering Provider:    Best Call Back Number:  [phone]  Additional Information:  Pt has 1 more pill left of this at this time

## 2019-12-12 ENCOUNTER — TELEPHONE (OUTPATIENT)
Dept: PODIATRY | Facility: CLINIC | Age: 64
End: 2019-12-12

## 2019-12-12 NOTE — TELEPHONE ENCOUNTER
Pt last seen in July. She is requesting a refill on diclofenac 75mg through [facility]. At last appt she was advised she could stop taking in 2wks. Do you want to refill or does pt need appt? Please advise

## 2019-12-12 NOTE — TELEPHONE ENCOUNTER
----- Message from Karyn Sweeney sent at 12/12/2019 12:14 PM CST -----  Contact: pt [phone]  Refill on her Diclofenac sodium 75 mg to be called into [facility].

## 2019-12-12 NOTE — TELEPHONE ENCOUNTER
Advised pt if she feels she needs to continue diclofenac appt needed for eval per provider. Pt states she will not take the medication for a while and see how she feels then will call if she needs to schedule appt.

## 2019-12-30 RX ORDER — DICLOFENAC SODIUM 75 MG/1
TABLET, DELAYED RELEASE ORAL
Qty: 180 TABLET | Refills: 1 | OUTPATIENT
Start: 2019-12-30

## 2019-12-31 NOTE — TELEPHONE ENCOUNTER
----- Message from Romaine Muhammad DPM sent at 12/30/2019 12:39 PM CST -----  Got rx refill request 3 months of diclofenac she should not need that at this point if she does should see for follow to determine added tx.

## 2020-04-13 ENCOUNTER — OFFICE VISIT (OUTPATIENT)
Dept: PODIATRY | Facility: CLINIC | Age: 65
End: 2020-04-13
Payer: MEDICARE

## 2020-04-13 VITALS
BODY MASS INDEX: 29.59 KG/M2 | DIASTOLIC BLOOD PRESSURE: 97 MMHG | TEMPERATURE: 99 F | SYSTOLIC BLOOD PRESSURE: 156 MMHG | WEIGHT: 167 LBS | HEART RATE: 98 BPM | HEIGHT: 63 IN

## 2020-04-13 DIAGNOSIS — L60.0 INGROWN NAIL: ICD-10-CM

## 2020-04-13 DIAGNOSIS — M20.12 HALLUX VALGUS OF LEFT FOOT: ICD-10-CM

## 2020-04-13 DIAGNOSIS — M19.079 OSTEOARTHRITIS OF ANKLE AND FOOT, UNSPECIFIED LATERALITY: Primary | ICD-10-CM

## 2020-04-13 PROCEDURE — 99213 OFFICE O/P EST LOW 20 MIN: CPT | Mod: S$GLB,,, | Performed by: PODIATRIST

## 2020-04-13 PROCEDURE — 3008F PR BODY MASS INDEX (BMI) DOCUMENTED: ICD-10-PCS | Mod: CPTII,S$GLB,, | Performed by: PODIATRIST

## 2020-04-13 PROCEDURE — 99999 PR PBB SHADOW E&M-EST. PATIENT-LVL III: ICD-10-PCS | Mod: PBBFAC,,, | Performed by: PODIATRIST

## 2020-04-13 PROCEDURE — 99999 PR PBB SHADOW E&M-EST. PATIENT-LVL III: CPT | Mod: PBBFAC,,, | Performed by: PODIATRIST

## 2020-04-13 PROCEDURE — 3008F BODY MASS INDEX DOCD: CPT | Mod: CPTII,S$GLB,, | Performed by: PODIATRIST

## 2020-04-13 PROCEDURE — 99213 PR OFFICE/OUTPT VISIT, EST, LEVL III, 20-29 MIN: ICD-10-PCS | Mod: S$GLB,,, | Performed by: PODIATRIST

## 2020-04-13 RX ORDER — DULOXETIN HYDROCHLORIDE 60 MG/1
CAPSULE, DELAYED RELEASE ORAL
COMMUNITY
Start: 2020-03-29

## 2020-04-13 RX ORDER — OXYBUTYNIN CHLORIDE 10 MG/1
TABLET, EXTENDED RELEASE ORAL
COMMUNITY
Start: 2020-03-03

## 2020-04-13 RX ORDER — MELOXICAM 15 MG/1
TABLET ORAL
COMMUNITY
Start: 2020-03-23

## 2020-04-13 NOTE — LETTER
April 16, 2020      Luann Clark, [facility]  [address] [place] [address]           [facility] - Podiatry/Wound Care  [address] [place] [address]  Phone: [phone]  Fax: [phone]          Patient: Kim Porter   MR Number: [id number]   YOB: 1955   Date of Visit: 4/13/2020       Dear Luann Clark:    Thank you for referring Kim Porter to me for evaluation. Attached you will find relevant portions of my assessment and plan of care.    If you have questions, please do not hesitate to call me. I look forward to following Kim Porter along with you.    Sincerely,    Romaine Muhammad, LUIS FELIPE    Enclosure  CC:  No Recipients    If you would like to receive this communication electronically, please contact [email or web address] or [phone] to request more information on [employer] Link access.    For providers and/or their staff who would like to refer a patient to [facility], please contact us through our one-stop-shop provider referral line, [facility], at [phone].    If you feel you have received this communication in error or would no longer like to receive these types of communications, please e-mail [email or web address]

## 2020-04-16 PROBLEM — L60.0 INGROWN NAIL: Status: ACTIVE | Noted: 2020-04-16

## 2020-04-16 PROBLEM — M20.12 HALLUX VALGUS OF LEFT FOOT: Status: ACTIVE | Noted: 2020-04-16

## 2020-04-16 NOTE — PROGRESS NOTES
Subjective:       Patient ID: Kim Porter is a 64 y.o. female.    Chief Complaint: Follow-up; Foot Pain; and Nail Problem    Patient presents today with a complaint of a painful bump on her left foot she is also concerned about the nail on the 2nd digit left being infected she states this has gotten progressively worse and makes it difficult for her to wear any closed in shoes anything that rubber touches the area causes her severe discomfort.  Foot Pain   Associated symptoms include arthralgias, joint swelling and numbness.   Follow-up   Associated symptoms include arthralgias, joint swelling and numbness.   Nail Problem   Associated symptoms include arthralgias, joint swelling and numbness.      Review of Systems   Musculoskeletal: Positive for arthralgias, gait problem and joint swelling.   Neurological: Positive for numbness.   All other systems reviewed and are negative.      Objective:      Physical Exam   Constitutional: She appears well-developed and well-nourished.   Cardiovascular:   Pulses:       Dorsalis pedis pulses are 1+ on the right side, and 1+ on the left side.        Posterior tibial pulses are 1+ on the right side, and 1+ on the left side.   Pulmonary/Chest: Effort normal.   Musculoskeletal: She exhibits edema and tenderness.        Right foot: There is deformity.        Left foot: There is decreased range of motion and deformity.        Feet:    Feet:   Right Foot:   Protective Sensation: 4 sites tested. 2 sites sensed.   Left Foot:   Protective Sensation: 4 sites tested. 2 sites sensed.   Skin Integrity: Positive for erythema and warmth.   Neurological: She is alert. She displays abnormal reflex.   Skin: Skin is warm. Capillary refill takes more than 3 seconds.   Psychiatric: She has a normal mood and affect. Her behavior is normal. Judgment and thought content normal.   Nursing note and vitals reviewed.              Assessment:       1. Osteoarthritis of ankle and foot, unspecified  laterality    2. Hallux valgus of left foot    3. Ingrown nail        Plan:       Patient presents today with a complaint of a painful bump on her left foot she is also concerned about the nail on the 2nd digit left being infected she states this has gotten progressively worse and makes it difficult for her to wear any closed in shoes anything that rubber touches the area causes her severe discomfort.  On evaluation patient has signs of ingrowing toenail on the 2nd digit left foot this was carefully debrided there was a small amount of drainage under the nail however not enough to do a culture on advised the patient majority of this nail deformity is related to fungal infection was able to thin and debride the nail which should give the patient significant relief of pressure.  Patient was advised to start applying Vicks vapor rub twice a day every day to address the fungal infection in the nail I did discuss the bone spurring surrounding the 1st MPJ of the left foot patient has this to a lesser extent on the right foot however this is not causing any discomfort or pain patient really does not have any significant limited range of motion on examination of these areas it does this peer that the spurring surrounding the 1st MPJ left is related to degenerative changes possibly mild underlying bunion deformity I did discuss at length in detail with the patient what would need to be done to address this surgically I have recommended a likely arthroplasty of the 1st MPJ advising her she would need to be in a postoperative shoe for about 3 weeks before gradual return to activity.  X-rays were not taken today I have advised the patient if she is considering pursuing surgical intervention we can take additional x-rays review them and discuss it more at length in detail what would need to be done to surgically address this patient is going to consider this surgically she indicated she would like to do it now however with the  current COVID-19 viral outbreak there is no elective surgery is being performed at this time so this would have to be deferred until this has been lifted.  Patient was in understanding agreement with this she will consider surgical options discussed today and contact us as needed for follow-up.  Patient indicated the area of ingrown toenail felt much better following removal of the nail I have recommended application of Vicks vapor rub to all the toenails twice a day every day for 4-6 months.  Total face-to-face time including discussion evaluation and treatment equaled 20 min.This note was created using M*Modal voice recognition software that occasionally misinterpreted phrases or words.    This note was created using M*Modal voice recognition software that occasionally misinterpreted phrases or words.

## 2020-07-15 ENCOUNTER — OFFICE VISIT (OUTPATIENT)
Dept: PODIATRY | Facility: CLINIC | Age: 65
End: 2020-07-15
Payer: MEDICARE

## 2020-07-15 ENCOUNTER — HOSPITAL ENCOUNTER (OUTPATIENT)
Dept: RADIOLOGY | Facility: HOSPITAL | Age: 65
Discharge: HOME | End: 2020-07-15
Attending: PODIATRIST
Payer: MEDICARE

## 2020-07-15 VITALS
TEMPERATURE: 99 F | DIASTOLIC BLOOD PRESSURE: 80 MMHG | HEART RATE: 95 BPM | SYSTOLIC BLOOD PRESSURE: 156 MMHG | HEIGHT: 63 IN | BODY MASS INDEX: 29.23 KG/M2 | WEIGHT: 165 LBS

## 2020-07-15 DIAGNOSIS — M20.12 HALLUX VALGUS OF LEFT FOOT: ICD-10-CM

## 2020-07-15 DIAGNOSIS — M19.072 OSTEOARTHRITIS OF LEFT ANKLE AND FOOT: ICD-10-CM

## 2020-07-15 DIAGNOSIS — M20.22 HALLUX RIGIDUS OF LEFT FOOT: Primary | ICD-10-CM

## 2020-07-15 PROCEDURE — 3008F PR BODY MASS INDEX (BMI) DOCUMENTED: ICD-10-PCS | Mod: CPTII,S$GLB,, | Performed by: PODIATRIST

## 2020-07-15 PROCEDURE — 99214 OFFICE O/P EST MOD 30 MIN: CPT | Mod: S$GLB,,, | Performed by: PODIATRIST

## 2020-07-15 PROCEDURE — 99999 PR PBB SHADOW E&M-EST. PATIENT-LVL V: CPT | Mod: PBBFAC,,, | Performed by: PODIATRIST

## 2020-07-15 PROCEDURE — 73630 X-RAY EXAM OF FOOT: CPT | Mod: 26,LT,, | Performed by: RADIOLOGY

## 2020-07-15 PROCEDURE — 99999 PR PBB SHADOW E&M-EST. PATIENT-LVL V: ICD-10-PCS | Mod: PBBFAC,,, | Performed by: PODIATRIST

## 2020-07-15 PROCEDURE — 73630 X-RAY EXAM OF FOOT: CPT | Mod: TC,FY,LT

## 2020-07-15 PROCEDURE — 3008F BODY MASS INDEX DOCD: CPT | Mod: CPTII,S$GLB,, | Performed by: PODIATRIST

## 2020-07-15 PROCEDURE — 99214 PR OFFICE/OUTPT VISIT, EST, LEVL IV, 30-39 MIN: ICD-10-PCS | Mod: S$GLB,,, | Performed by: PODIATRIST

## 2020-07-15 PROCEDURE — 73630 XR FOOT COMPLETE 3 VIEW LEFT: ICD-10-PCS | Mod: 26,LT,, | Performed by: RADIOLOGY

## 2020-07-15 RX ORDER — ROSUVASTATIN CALCIUM 20 MG/1
TABLET, COATED ORAL
COMMUNITY
Start: 2020-06-23

## 2020-07-15 NOTE — LETTER
July 19, 2020      Luann Clark, [facility]  [address] [facility] [place] [address]           [facility] - Podiatry/Wound Care  [address] [facility] [place] [address]  Phone: [phone]  Fax: [phone]          Patient: Kim Porter   MR Number: [id number]   YOB: 1955   Date of Visit: 7/15/2020       Dear Luann Clark:    Thank you for referring Kim Porter to me for evaluation. Attached you will find relevant portions of my assessment and plan of care.    If you have questions, please do not hesitate to call me. I look forward to following Kim Porter along with you.    Sincerely,    Romaine Muhammad, LUIS FELIPE    Enclosure  CC:  No Recipients    If you would like to receive this communication electronically, please contact [email or web address] or [phone] to request more information on [employer] Link access.    For providers and/or their staff who would like to refer a patient to [facility], please contact us through our one-stop-shop provider referral line, [facility], at [phone].    If you feel you have received this communication in error or would no longer like to receive these types of communications, please e-mail [email or web address]

## 2020-07-20 ENCOUNTER — TELEPHONE (OUTPATIENT)
Dept: PODIATRY | Facility: CLINIC | Age: 65
End: 2020-07-20

## 2020-07-20 ENCOUNTER — OFFICE VISIT (OUTPATIENT)
Dept: PODIATRY | Facility: CLINIC | Age: 65
End: 2020-07-20
Payer: MEDICARE

## 2020-07-20 VITALS
SYSTOLIC BLOOD PRESSURE: 142 MMHG | DIASTOLIC BLOOD PRESSURE: 91 MMHG | OXYGEN SATURATION: 96 % | BODY MASS INDEX: 29.23 KG/M2 | HEIGHT: 63 IN | RESPIRATION RATE: 18 BRPM | WEIGHT: 165 LBS | HEART RATE: 99 BPM | TEMPERATURE: 98 F

## 2020-07-20 DIAGNOSIS — Z01.818 PRE-OP EXAM: ICD-10-CM

## 2020-07-20 DIAGNOSIS — M19.072 OSTEOARTHRITIS OF LEFT ANKLE AND FOOT: ICD-10-CM

## 2020-07-20 DIAGNOSIS — M20.22 HALLUX RIGIDUS, LEFT FOOT: Primary | ICD-10-CM

## 2020-07-20 PROCEDURE — 99214 OFFICE O/P EST MOD 30 MIN: CPT | Mod: 57,S$GLB,, | Performed by: PODIATRIST

## 2020-07-20 PROCEDURE — 99999 PR PBB SHADOW E&M-EST. PATIENT-LVL V: ICD-10-PCS | Mod: PBBFAC,,, | Performed by: PODIATRIST

## 2020-07-20 PROCEDURE — 99214 PR OFFICE/OUTPT VISIT, EST, LEVL IV, 30-39 MIN: ICD-10-PCS | Mod: 57,S$GLB,, | Performed by: PODIATRIST

## 2020-07-20 PROCEDURE — 3008F PR BODY MASS INDEX (BMI) DOCUMENTED: ICD-10-PCS | Mod: CPTII,S$GLB,, | Performed by: PODIATRIST

## 2020-07-20 PROCEDURE — 3008F BODY MASS INDEX DOCD: CPT | Mod: CPTII,S$GLB,, | Performed by: PODIATRIST

## 2020-07-20 PROCEDURE — 99999 PR PBB SHADOW E&M-EST. PATIENT-LVL V: CPT | Mod: PBBFAC,,, | Performed by: PODIATRIST

## 2020-07-20 RX ORDER — SODIUM CHLORIDE, SODIUM LACTATE, POTASSIUM CHLORIDE, CALCIUM CHLORIDE 600; 310; 30; 20 MG/100ML; MG/100ML; MG/100ML; MG/100ML
INJECTION, SOLUTION INTRAVENOUS CONTINUOUS
Status: CANCELLED | OUTPATIENT
Start: 2020-07-24

## 2020-07-20 RX ORDER — SULFAMETHOXAZOLE AND TRIMETHOPRIM 400; 80 MG/1; MG/1
2 TABLET ORAL 2 TIMES DAILY
Qty: 56 TABLET | Refills: 0 | Status: SHIPPED | OUTPATIENT
Start: 2020-07-20 | End: 2020-08-03

## 2020-07-20 RX ORDER — OXYCODONE AND ACETAMINOPHEN 5; 325 MG/1; MG/1
2 TABLET ORAL
Qty: 60 TABLET | Refills: 0 | Status: SHIPPED | OUTPATIENT
Start: 2020-07-20 | End: 2020-07-25

## 2020-07-20 RX ORDER — ONDANSETRON HYDROCHLORIDE 8 MG/1
8 TABLET, FILM COATED ORAL EVERY 8 HOURS PRN
Qty: 42 TABLET | Refills: 1 | Status: SHIPPED | OUTPATIENT
Start: 2020-07-20 | End: 2020-08-03

## 2020-07-20 NOTE — LETTER
July 20, 2020      Luann Clark, [facility]  [address] [facility] [place] [address]           [facility] - Podiatry/Wound Care  [address] [facility] [address] [place] [address]  Phone: [phone]  Fax: [phone]          Patient: Kim Porter   MR Number: [id number]   YOB: 1955   Date of Visit: 7/20/2020       Dear Luann Clark:    Thank you for referring Kim Porter to me for evaluation. Attached you will find relevant portions of my assessment and plan of care.    If you have questions, please do not hesitate to call me. I look forward to following Kim Porter along with you.    Sincerely,    Romaine Muhammad, LUIS FELIPE    Enclosure  CC:  No Recipients    If you would like to receive this communication electronically, please contact [email or web address] or [phone] to request more information on [employer] Link access.    For providers and/or their staff who would like to refer a patient to [facility], please contact us through our one-stop-shop provider referral line, [facility], at [phone].    If you feel you have received this communication in error or would no longer like to receive these types of communications, please e-mail [email or web address]

## 2020-07-20 NOTE — PATIENT INSTRUCTIONS
Nothing to eat or drink after midnight.  If you take medication for high blood pressure take this in the morning with a sip of water prior to surgery.     Arrive at out patient registration at 8:45 am

## 2020-07-20 NOTE — H&P (VIEW-ONLY)
Subjective:       Patient ID: Kim Porter is a 64 y.o. female.    Chief Complaint: Pre-op Exam (left foot ), Foot Pain, and Foot Problem    Patient presents today with a complaint of a painful bump on her left foot.  Patient indicates she would like to discuss what can be surgically done to address this.  Foot Pain  Associated symptoms include arthralgias, joint swelling and numbness.   Follow-up  Associated symptoms include arthralgias, joint swelling and numbness.   Nail Problem  Associated symptoms include arthralgias, joint swelling and numbness.      Review of Systems   Musculoskeletal: Positive for arthralgias, gait problem and joint swelling.   Neurological: Positive for numbness.   All other systems reviewed and are negative.      Objective:      Physical Exam  Vitals signs and nursing note reviewed.   Constitutional:       Appearance: Normal appearance. She is well-developed.   Cardiovascular:      Rate and Rhythm: Normal rate and regular rhythm.      Pulses:           Dorsalis pedis pulses are 1+ on the right side and 1+ on the left side.        Posterior tibial pulses are 1+ on the right side and 1+ on the left side.   Pulmonary:      Effort: Pulmonary effort is normal.      Breath sounds: Rales present.   Musculoskeletal:         General: Swelling, tenderness and deformity present.      Right foot: Deformity present.      Left foot: Decreased range of motion. Deformity present.        Feet:    Feet:      Right foot:      Protective Sensation: 4 sites tested. 2 sites sensed.      Left foot:      Protective Sensation: 4 sites tested. 2 sites sensed.      Skin integrity: Erythema and warmth present.   Skin:     General: Skin is warm.      Capillary Refill: Capillary refill takes more than 3 seconds.   Neurological:      General: No focal deficit present.      Mental Status: She is alert.   Psychiatric:         Behavior: Behavior normal.         Thought Content: Thought content normal.          Judgment: Judgment normal.                 Assessment:       1. Hallux rigidus, left foot    2. Pre-op exam    3. Osteoarthritis of left ankle and foot        Plan:       Patient presents today with a complaint of a painful bump on her left foot.  I did discuss the bone spurring surrounding the 1st MPJ of the left foot patient has this to a lesser extent on the right foot however this is not causing any discomfort or pain patient really does not have any significant limited range of motion on examination of these areas it does appear that the spurring surrounding the 1st MPJ left is related to degenerative changes possibly mild underlying bunion deformity I did discuss at length in detail with the patient what would need to be done to address this surgically I have recommended a likely arthroplasty of the 1st MPJ advising her she would need to be in a postoperative shoe for about 3 weeks before gradual return to activity.  X-rays were taken today and discussed and reviewed with the patient she does have significant spurring surrounding the 1st MPJ of the left foot.  Following evaluation of the x-rays I explained to the patient that an arthroplasty of the 1st MPJ left would be the appropriate procedure to remove the bone spurs which would give her significant relief.  Patient was in in understanding and agreement with this patient has indicated she would like to pursue surgery as soon as possible we have scheduled surgery for July 24, 2020.  Decision made for surgery today.  Patient presents today for preoperative history and physical in discussion all aspects of surgery were discussed with the patient no guarantees were given written or implied all potential complications including but not limited to delayed healing nonhealing postoperative pain infection recurrence were discussed with the patient in detail. All aspect of the patient's recovery time involved in recovery patient responsibility is involving recovery  were also discussed in detail. Patient advised failure to comply with postoperative care will jeopardize surgical outcome.  All aspects of surgical intervention were discussed at length and in detail patient understands she will be able to bear weight after surgery however it needs to be minimal and only on her heel with her postoperative shoe.  Patient understands she will need to ice and elevate for the 1st several weeks following surgery and she needs to keep the dressing dry and intact.  Patient was dispensed prescriptions for Percocet Zofran and Bactrim she does have an allergy to hydrocodone but states she has tolerated Percocet in the past.  Preoperative lab work and chest x-ray have been ordered patient is a smoker she understands she is at increased risk for nonhealing and delayed healing.  All preoperative lab work chest x-ray will be evaluated prior to surgery which is scheduled for July 24, 2020.  Patient has been advised to contact us with any problems questions or concerns prior to surgery patient will be NPO after midnight she will start a daily aspirin 24 hr after surgery to minimize the possibility of DVT.  Total face-to-face time including discussion evaluation treatment surgical consultation and decision for surgery equaled 30 min. This note was created using [employer] voice recognition software that occasionally misinterpreted phrases or words.

## 2020-07-20 NOTE — PROGRESS NOTES
Subjective:       Patient ID: Kim Porter is a 64 y.o. female.    Chief Complaint: Foot Pain (bunion left foot ) and Foot Problem    Patient presents today with a complaint of a painful bump on her left foot.  Patient indicates she would like to discuss what can be surgically done to address this.  Follow-up  Associated symptoms include arthralgias, joint swelling and numbness.   Foot Pain  Associated symptoms include arthralgias, joint swelling and numbness.   Nail Problem  Associated symptoms include arthralgias, joint swelling and numbness.      Review of Systems   Musculoskeletal: Positive for arthralgias, gait problem and joint swelling.   Neurological: Positive for numbness.   All other systems reviewed and are negative.      Objective:      Physical Exam  Vitals signs and nursing note reviewed.   Constitutional:       Appearance: She is well-developed.   Cardiovascular:      Pulses:           Dorsalis pedis pulses are 1+ on the right side and 1+ on the left side.        Posterior tibial pulses are 1+ on the right side and 1+ on the left side.   Pulmonary:      Effort: Pulmonary effort is normal.   Musculoskeletal:         General: Tenderness present.      Right foot: Deformity present.      Left foot: Decreased range of motion. Deformity present.        Feet:    Feet:      Right foot:      Protective Sensation: 4 sites tested. 2 sites sensed.      Left foot:      Protective Sensation: 4 sites tested. 2 sites sensed.      Skin integrity: Erythema and warmth present.   Skin:     General: Skin is warm.      Capillary Refill: Capillary refill takes more than 3 seconds.   Neurological:      Mental Status: She is alert.      Deep Tendon Reflexes: Reflexes abnormal.   Psychiatric:         Behavior: Behavior normal.         Thought Content: Thought content normal.         Judgment: Judgment normal.                 Assessment:       1. Hallux rigidus of left foot    2. Hallux valgus of left foot    3.  Osteoarthritis of left ankle and foot        Plan:       Patient presents today with a complaint of a painful bump on her left foot.  I did discuss the bone spurring surrounding the 1st MPJ of the left foot patient has this to a lesser extent on the right foot however this is not causing any discomfort or pain patient really does not have any significant limited range of motion on examination of these areas it does appear that the spurring surrounding the 1st MPJ left is related to degenerative changes possibly mild underlying bunion deformity I did discuss at length in detail with the patient what would need to be done to address this surgically I have recommended a likely arthroplasty of the 1st MPJ advising her she would need to be in a postoperative shoe for about 3 weeks before gradual return to activity.  X-rays were taken today and discussed and reviewed with the patient she does have significant spurring surrounding the 1st MPJ of the left foot.  Following evaluation of the x-rays I explained to the patient that an arthroplasty of the 1st MPJ left would be the appropriate procedure to remove the bone spurs which would give her significant relief.  Patient was in in understanding and agreement with this patient has indicated she would like to pursue surgery as soon as possible we have scheduled surgery for July 24, 2020 I will preop the patient next week prior to the surgery date.  Patient has been advised to contact us with any problems questions or concerns prior to this time.  Total face-to-face time including discussion evaluation and treatment equaled 25 min.This note was created using [employer] voice recognition software that occasionally misinterpreted phrases or words.

## 2020-07-20 NOTE — TELEPHONE ENCOUNTER
----- Message from Jeanna Conner sent at 7/20/2020 12:20 PM CDT -----  Regarding: pharmacy auth  Contact: [facility]  Type:  Pharmacy Calling to Clarify an RX    Name of Caller: kamar  [facility] Name:  [facility]  Prescription Name:  oxyCODONE-acetaminophen (PERCOCET) 5-325 mg per tablet  What do they need to clarify?:  ICD10 CODE  Best Call Back Number: [phone]  Additional Information:

## 2020-07-20 NOTE — PROGRESS NOTES
Subjective:       Patient ID: Kim Porter is a 64 y.o. female.    Chief Complaint: Pre-op Exam (left foot ), Foot Pain, and Foot Problem    Patient presents today with a complaint of a painful bump on her left foot.  Patient indicates she would like to discuss what can be surgically done to address this.  Foot Pain  Associated symptoms include arthralgias, joint swelling and numbness.   Follow-up  Associated symptoms include arthralgias, joint swelling and numbness.   Nail Problem  Associated symptoms include arthralgias, joint swelling and numbness.      Review of Systems   Musculoskeletal: Positive for arthralgias, gait problem and joint swelling.   Neurological: Positive for numbness.   All other systems reviewed and are negative.      Objective:      Physical Exam  Vitals signs and nursing note reviewed.   Constitutional:       Appearance: Normal appearance. She is well-developed.   Cardiovascular:      Rate and Rhythm: Normal rate and regular rhythm.      Pulses:           Dorsalis pedis pulses are 1+ on the right side and 1+ on the left side.        Posterior tibial pulses are 1+ on the right side and 1+ on the left side.   Pulmonary:      Effort: Pulmonary effort is normal.      Breath sounds: Rales present.   Musculoskeletal:         General: Swelling, tenderness and deformity present.      Right foot: Deformity present.      Left foot: Decreased range of motion. Deformity present.        Feet:    Feet:      Right foot:      Protective Sensation: 4 sites tested. 2 sites sensed.      Left foot:      Protective Sensation: 4 sites tested. 2 sites sensed.      Skin integrity: Erythema and warmth present.   Skin:     General: Skin is warm.      Capillary Refill: Capillary refill takes more than 3 seconds.   Neurological:      General: No focal deficit present.      Mental Status: She is alert.   Psychiatric:         Behavior: Behavior normal.         Thought Content: Thought content normal.          Judgment: Judgment normal.                 Assessment:       1. Hallux rigidus, left foot    2. Pre-op exam    3. Osteoarthritis of left ankle and foot        Plan:       Patient presents today with a complaint of a painful bump on her left foot.  I did discuss the bone spurring surrounding the 1st MPJ of the left foot patient has this to a lesser extent on the right foot however this is not causing any discomfort or pain patient really does not have any significant limited range of motion on examination of these areas it does appear that the spurring surrounding the 1st MPJ left is related to degenerative changes possibly mild underlying bunion deformity I did discuss at length in detail with the patient what would need to be done to address this surgically I have recommended a likely arthroplasty of the 1st MPJ advising her she would need to be in a postoperative shoe for about 3 weeks before gradual return to activity.  X-rays were taken today and discussed and reviewed with the patient she does have significant spurring surrounding the 1st MPJ of the left foot.  Following evaluation of the x-rays I explained to the patient that an arthroplasty of the 1st MPJ left would be the appropriate procedure to remove the bone spurs which would give her significant relief.  Patient was in in understanding and agreement with this patient has indicated she would like to pursue surgery as soon as possible we have scheduled surgery for July 24, 2020.  Decision made for surgery today.  Patient presents today for preoperative history and physical in discussion all aspects of surgery were discussed with the patient no guarantees were given written or implied all potential complications including but not limited to delayed healing nonhealing postoperative pain infection recurrence were discussed with the patient in detail. All aspect of the patient's recovery time involved in recovery patient responsibility is involving recovery  were also discussed in detail. Patient advised failure to comply with postoperative care will jeopardize surgical outcome.  All aspects of surgical intervention were discussed at length and in detail patient understands she will be able to bear weight after surgery however it needs to be minimal and only on her heel with her postoperative shoe.  Patient understands she will need to ice and elevate for the 1st several weeks following surgery and she needs to keep the dressing dry and intact.  Patient was dispensed prescriptions for Percocet Zofran and Bactrim she does have an allergy to hydrocodone but states she has tolerated Percocet in the past.  Preoperative lab work and chest x-ray have been ordered patient is a smoker she understands she is at increased risk for nonhealing and delayed healing.  All preoperative lab work chest x-ray will be evaluated prior to surgery which is scheduled for July 24, 2020.  Patient has been advised to contact us with any problems questions or concerns prior to surgery patient will be NPO after midnight she will start a daily aspirin 24 hr after surgery to minimize the possibility of DVT.  Total face-to-face time including discussion evaluation treatment surgical consultation and decision for surgery equaled 30 min. This note was created using [employer] voice recognition software that occasionally misinterpreted phrases or words.

## 2020-07-21 ENCOUNTER — HOSPITAL ENCOUNTER (OUTPATIENT)
Dept: PREADMISSION TESTING | Facility: HOSPITAL | Age: 65
Discharge: HOME | End: 2020-07-21
Attending: PODIATRIST
Payer: MEDICARE

## 2020-07-21 ENCOUNTER — HOSPITAL ENCOUNTER (OUTPATIENT)
Dept: RADIOLOGY | Facility: HOSPITAL | Age: 65
Discharge: HOME | End: 2020-07-21
Attending: PODIATRIST
Payer: MEDICARE

## 2020-07-21 VITALS — BODY MASS INDEX: 29.23 KG/M2 | HEIGHT: 63 IN | WEIGHT: 165 LBS

## 2020-07-21 DIAGNOSIS — M20.22 HALLUX RIGIDUS, LEFT FOOT: ICD-10-CM

## 2020-07-21 DIAGNOSIS — Z01.818 PREOP TESTING: ICD-10-CM

## 2020-07-21 DIAGNOSIS — Z01.818 PRE-OP EXAM: ICD-10-CM

## 2020-07-21 PROCEDURE — 71046 X-RAY EXAM CHEST 2 VIEWS: CPT | Mod: 26,,, | Performed by: RADIOLOGY

## 2020-07-21 PROCEDURE — 71046 X-RAY EXAM CHEST 2 VIEWS: CPT | Mod: TC,FY

## 2020-07-21 PROCEDURE — 71046 XR CHEST PA AND LATERAL: ICD-10-PCS | Mod: 26,,, | Performed by: RADIOLOGY

## 2020-07-21 PROCEDURE — 93005 ELECTROCARDIOGRAM TRACING: CPT

## 2020-07-21 PROCEDURE — 99900103 DSU ONLY-NO CHARGE-INITIAL HR (STAT)

## 2020-07-21 PROCEDURE — U0003 INFECTIOUS AGENT DETECTION BY NUCLEIC ACID (DNA OR RNA); SEVERE ACUTE RESPIRATORY SYNDROME CORONAVIRUS 2 (SARS-COV-2) (CORONAVIRUS DISEASE [COVID-19]), AMPLIFIED PROBE TECHNIQUE, MAKING USE OF HIGH THROUGHPUT TECHNOLOGIES AS DESCRIBED BY CMS-2020-01-R: HCPCS

## 2020-07-22 LAB — SARS-COV-2 RNA RESP QL NAA+PROBE: NOT DETECTED

## 2020-07-24 ENCOUNTER — ANESTHESIA (OUTPATIENT)
Dept: SURGERY | Facility: HOSPITAL | Age: 65
End: 2020-07-24
Payer: MEDICARE

## 2020-07-24 ENCOUNTER — HOSPITAL ENCOUNTER (OUTPATIENT)
Facility: HOSPITAL | Age: 65
Discharge: HOME | End: 2020-07-24
Attending: PODIATRIST | Admitting: PODIATRIST
Payer: MEDICARE

## 2020-07-24 ENCOUNTER — ANESTHESIA EVENT (OUTPATIENT)
Dept: SURGERY | Facility: HOSPITAL | Age: 65
End: 2020-07-24
Payer: MEDICARE

## 2020-07-24 VITALS
SYSTOLIC BLOOD PRESSURE: 131 MMHG | OXYGEN SATURATION: 96 % | BODY MASS INDEX: 29.23 KG/M2 | WEIGHT: 165 LBS | TEMPERATURE: 98 F | DIASTOLIC BLOOD PRESSURE: 68 MMHG | HEART RATE: 95 BPM | HEIGHT: 63 IN | RESPIRATION RATE: 16 BRPM

## 2020-07-24 DIAGNOSIS — M19.072 OSTEOARTHRITIS OF LEFT ANKLE AND FOOT: ICD-10-CM

## 2020-07-24 DIAGNOSIS — Z01.818 PRE-OP EXAM: ICD-10-CM

## 2020-07-24 DIAGNOSIS — M20.22 HALLUX RIGIDUS OF LEFT FOOT: ICD-10-CM

## 2020-07-24 DIAGNOSIS — M20.22 HALLUX RIGIDUS, LEFT FOOT: Primary | ICD-10-CM

## 2020-07-24 PROCEDURE — 36000708 HC OR TIME LEV III 1ST 15 MIN: Performed by: PODIATRIST

## 2020-07-24 PROCEDURE — 27201423 OPTIME MED/SURG SUP & DEVICES STERILE SUPPLY: Performed by: PODIATRIST

## 2020-07-24 PROCEDURE — 28289 CORRJ HALUX RIGDUS W/O IMPLT: CPT | Mod: TA,,, | Performed by: PODIATRIST

## 2020-07-24 PROCEDURE — D9220A PRA ANESTHESIA: Mod: ANES,,, | Performed by: ANESTHESIOLOGY

## 2020-07-24 PROCEDURE — D9220A PRA ANESTHESIA: Mod: CRNA,,, | Performed by: NURSE ANESTHETIST, CERTIFIED REGISTERED

## 2020-07-24 PROCEDURE — 71000033 HC RECOVERY, INTIAL HOUR: Performed by: PODIATRIST

## 2020-07-24 PROCEDURE — 01480 ANES OPEN PX LOWER L/A/F NOS: CPT | Performed by: PODIATRIST

## 2020-07-24 PROCEDURE — 37000009 HC ANESTHESIA EA ADD 15 MINS: Performed by: PODIATRIST

## 2020-07-24 PROCEDURE — 63600175 PHARM REV CODE 636 W HCPCS: Performed by: PODIATRIST

## 2020-07-24 PROCEDURE — 63600175 PHARM REV CODE 636 W HCPCS: Performed by: NURSE ANESTHETIST, CERTIFIED REGISTERED

## 2020-07-24 PROCEDURE — 71000015 HC POSTOP RECOV 1ST HR: Performed by: PODIATRIST

## 2020-07-24 PROCEDURE — 28289 PR REPAIR HALLUX RIGIDUS; W/O IMPLANT: ICD-10-PCS | Mod: TA,,, | Performed by: PODIATRIST

## 2020-07-24 PROCEDURE — 25000003 PHARM REV CODE 250: Performed by: PODIATRIST

## 2020-07-24 PROCEDURE — 36000709 HC OR TIME LEV III EA ADD 15 MIN: Performed by: PODIATRIST

## 2020-07-24 PROCEDURE — 25000003 PHARM REV CODE 250

## 2020-07-24 PROCEDURE — 25000003 PHARM REV CODE 250: Performed by: NURSE ANESTHETIST, CERTIFIED REGISTERED

## 2020-07-24 PROCEDURE — D9220A PRA ANESTHESIA: ICD-10-PCS | Mod: CRNA,,, | Performed by: NURSE ANESTHETIST, CERTIFIED REGISTERED

## 2020-07-24 PROCEDURE — 37000008 HC ANESTHESIA 1ST 15 MINUTES: Performed by: PODIATRIST

## 2020-07-24 PROCEDURE — D9220A PRA ANESTHESIA: ICD-10-PCS | Mod: ANES,,, | Performed by: ANESTHESIOLOGY

## 2020-07-24 PROCEDURE — S0020 INJECTION, BUPIVICAINE HYDRO: HCPCS

## 2020-07-24 RX ORDER — PHENYLEPHRINE HYDROCHLORIDE 10 MG/ML
INJECTION INTRAVENOUS
Status: DISCONTINUED | OUTPATIENT
Start: 2020-07-24 | End: 2020-07-24

## 2020-07-24 RX ORDER — SODIUM CHLORIDE, SODIUM LACTATE, POTASSIUM CHLORIDE, CALCIUM CHLORIDE 600; 310; 30; 20 MG/100ML; MG/100ML; MG/100ML; MG/100ML
INJECTION, SOLUTION INTRAVENOUS CONTINUOUS
Status: CANCELLED | OUTPATIENT
Start: 2020-07-24

## 2020-07-24 RX ORDER — SODIUM CHLORIDE, SODIUM LACTATE, POTASSIUM CHLORIDE, CALCIUM CHLORIDE 600; 310; 30; 20 MG/100ML; MG/100ML; MG/100ML; MG/100ML
INJECTION, SOLUTION INTRAVENOUS CONTINUOUS
Status: DISCONTINUED | OUTPATIENT
Start: 2020-07-24 | End: 2020-07-24 | Stop reason: HOSPADM

## 2020-07-24 RX ORDER — LIDOCAINE HYDROCHLORIDE 20 MG/ML
INJECTION, SOLUTION EPIDURAL; INFILTRATION; INTRACAUDAL; PERINEURAL
Status: DISCONTINUED | OUTPATIENT
Start: 2020-07-24 | End: 2020-07-24

## 2020-07-24 RX ORDER — LIDOCAINE HYDROCHLORIDE 10 MG/ML
INJECTION INFILTRATION; PERINEURAL
Status: DISCONTINUED | OUTPATIENT
Start: 2020-07-24 | End: 2020-07-24 | Stop reason: HOSPADM

## 2020-07-24 RX ORDER — LIDOCAINE HYDROCHLORIDE 10 MG/ML
1 INJECTION, SOLUTION EPIDURAL; INFILTRATION; INTRACAUDAL; PERINEURAL ONCE
Status: CANCELLED | OUTPATIENT
Start: 2020-07-24 | End: 2020-07-24

## 2020-07-24 RX ORDER — ONDANSETRON 2 MG/ML
INJECTION INTRAMUSCULAR; INTRAVENOUS
Status: DISCONTINUED | OUTPATIENT
Start: 2020-07-24 | End: 2020-07-24

## 2020-07-24 RX ORDER — MEPERIDINE HYDROCHLORIDE 50 MG/ML
INJECTION INTRAMUSCULAR; INTRAVENOUS; SUBCUTANEOUS
Status: DISCONTINUED | OUTPATIENT
Start: 2020-07-24 | End: 2020-07-24

## 2020-07-24 RX ORDER — PROPOFOL 10 MG/ML
VIAL (ML) INTRAVENOUS
Status: DISCONTINUED | OUTPATIENT
Start: 2020-07-24 | End: 2020-07-24

## 2020-07-24 RX ORDER — EPHEDRINE SULFATE 50 MG/ML
INJECTION, SOLUTION INTRAVENOUS
Status: DISCONTINUED | OUTPATIENT
Start: 2020-07-24 | End: 2020-07-24

## 2020-07-24 RX ORDER — SODIUM CHLORIDE, SODIUM LACTATE, POTASSIUM CHLORIDE, CALCIUM CHLORIDE 600; 310; 30; 20 MG/100ML; MG/100ML; MG/100ML; MG/100ML
125 INJECTION, SOLUTION INTRAVENOUS CONTINUOUS
Status: DISCONTINUED | OUTPATIENT
Start: 2020-07-24 | End: 2020-07-24 | Stop reason: HOSPADM

## 2020-07-24 RX ORDER — MORPHINE SULFATE 2 MG/ML
2 INJECTION, SOLUTION INTRAMUSCULAR; INTRAVENOUS EVERY 5 MIN PRN
Status: DISCONTINUED | OUTPATIENT
Start: 2020-07-24 | End: 2020-07-24 | Stop reason: HOSPADM

## 2020-07-24 RX ORDER — BUPIVACAINE HYDROCHLORIDE 5 MG/ML
INJECTION, SOLUTION PERINEURAL
Status: DISCONTINUED | OUTPATIENT
Start: 2020-07-24 | End: 2020-07-24 | Stop reason: HOSPADM

## 2020-07-24 RX ORDER — MIDAZOLAM HYDROCHLORIDE 1 MG/ML
INJECTION, SOLUTION INTRAMUSCULAR; INTRAVENOUS
Status: DISCONTINUED | OUTPATIENT
Start: 2020-07-24 | End: 2020-07-24

## 2020-07-24 RX ORDER — SODIUM CHLORIDE, SODIUM LACTATE, POTASSIUM CHLORIDE, CALCIUM CHLORIDE 600; 310; 30; 20 MG/100ML; MG/100ML; MG/100ML; MG/100ML
INJECTION, SOLUTION INTRAVENOUS CONTINUOUS PRN
Status: DISCONTINUED | OUTPATIENT
Start: 2020-07-24 | End: 2020-07-24

## 2020-07-24 RX ORDER — ONDANSETRON 2 MG/ML
4 INJECTION INTRAMUSCULAR; INTRAVENOUS DAILY PRN
Status: DISCONTINUED | OUTPATIENT
Start: 2020-07-24 | End: 2020-07-24 | Stop reason: HOSPADM

## 2020-07-24 RX ORDER — CEFAZOLIN SODIUM 1 G/50ML
1 SOLUTION INTRAVENOUS
Status: COMPLETED | OUTPATIENT
Start: 2020-07-24 | End: 2020-07-24

## 2020-07-24 RX ADMIN — SODIUM CHLORIDE, POTASSIUM CHLORIDE, SODIUM LACTATE AND CALCIUM CHLORIDE: 600; 310; 30; 20 INJECTION, SOLUTION INTRAVENOUS at 09:07

## 2020-07-24 RX ADMIN — MIDAZOLAM HYDROCHLORIDE 2 MG: 1 INJECTION, SOLUTION INTRAMUSCULAR; INTRAVENOUS at 09:07

## 2020-07-24 RX ADMIN — MEPERIDINE HYDROCHLORIDE 50 MG: 50 INJECTION INTRAMUSCULAR; INTRAVENOUS; SUBCUTANEOUS at 09:07

## 2020-07-24 RX ADMIN — ONDANSETRON 4 MG: 2 INJECTION INTRAMUSCULAR; INTRAVENOUS at 09:07

## 2020-07-24 RX ADMIN — PROPOFOL 200 MG: 10 INJECTION, EMULSION INTRAVENOUS at 09:07

## 2020-07-24 RX ADMIN — EPHEDRINE SULFATE 20 MG: 50 INJECTION INTRAVENOUS at 09:07

## 2020-07-24 RX ADMIN — PHENYLEPHRINE HYDROCHLORIDE 100 MCG: 10 INJECTION INTRAVENOUS at 10:07

## 2020-07-24 RX ADMIN — CEFAZOLIN SODIUM 1 G: 1 SOLUTION INTRAVENOUS at 09:07

## 2020-07-24 RX ADMIN — EPHEDRINE SULFATE 15 MG: 50 INJECTION INTRAVENOUS at 09:07

## 2020-07-24 RX ADMIN — PHENYLEPHRINE HYDROCHLORIDE 100 MCG: 10 INJECTION INTRAVENOUS at 09:07

## 2020-07-24 RX ADMIN — LIDOCAINE HYDROCHLORIDE 100 MG: 20 INJECTION, SOLUTION EPIDURAL; INFILTRATION; INTRACAUDAL; PERINEURAL at 09:07

## 2020-07-24 NOTE — TRANSFER OF CARE
"Anesthesia Transfer of Care Note    Patient: Kim Porter    Procedure(s) Performed: Procedure(s) (LRB):  ARTHROPLASTY, FOOT (Left)    Patient location: PACU    Anesthesia Type: general    Transport from OR: Transported from OR on room air with adequate spontaneous ventilation    Post pain: adequate analgesia    Post assessment: no apparent anesthetic complications and tolerated procedure well    Post vital signs: stable    Level of consciousness: sedated and responds to stimulation    Nausea/Vomiting: no nausea/vomiting    Complications: none    Transfer of care protocol was followed      Last vitals:   Visit Vitals  BP (!) 146/76 (BP Location: Right arm, Patient Position: Lying)   Pulse 83   Temp 36.8 °C (98.2 °F) (Oral)   Resp 13   Ht 5' 3" (1.6 m)   Wt 74.8 kg (165 lb)   SpO2 95%   Breastfeeding No   BMI 29.23 kg/m²     "

## 2020-07-24 NOTE — INTERVAL H&P NOTE
The patient has been examined and the H&P has been reviewed:    I concur with the findings and no changes have occurred since H&P was written.    Anesthesia/Surgery risks, benefits and alternative options discussed and understood by patient/family.          Active Hospital Problems    Diagnosis  POA    Hallux rigidus of left foot [M20.22]  Yes    Hallux rigidus, left foot [M20.22]  Yes      Resolved Hospital Problems   No resolved problems to display.

## 2020-07-24 NOTE — OP NOTE
[facility] - Periop Services  Operative Note     SUMMARY     Surgery Date: 7/24/2020       Pre-op Diagnosis:  Hallux rigidus of left foot [M20.22]    Post-op Diagnosis:  Post-Op Diagnosis Codes:     * Hallux rigidus of left foot [M20.22]    Procedure(s) (LRB):  ARTHROPLASTY, FOOT (Left)  Excision bony exostosis and degenerative arthritis for hallux rigidus surrounding the 1st MPJ left procedure code 42998  Surgeon(s) and Role:     * Romaine Muhammad DPM - Primary      Estimated Blood Loss:  Minimal  Hemostasis:  Ankle tourniquet placed to 250 mm of mercury for a period of 31 min    Anesthesia:  LMA in conjunction with local infiltrate equaling 20 cc of 1% lidocaine plain    Description of the findings of the procedure:  Hallux rigidus of left foot [M20.22]       Condition stable  Complications none  Materials used sterile irrigant bone wax 3.0 Vicryl 4.0 Vicryl 4.0 Monocryl masses all Steri-Strips  Injectables 30 cc of 0.5% Marcaine plain  Pathology none  Specimens (From admission, onward)    None           Procedure:  Patient was taken to the operating room placed in supine position on the OR table attention was then directed towards the patient's left ankle where Webril cast padding was placed on the patient's left ankle as was an ankle tourniquet.  Following this patient was locally anesthetized in a regional block of the 1st ray of the left foot utilizing of 20 cc of 1% lidocaine plain.  After having done this there was prepped and draped in the usual sterile manner patient's foot was then exsanguinated utilizing an Esmarch bandage and the ankle tourniquet was raised to 250 mm of mercury.  Intraoperative fluoroscopy was used to plan the incision overlying the dorsal aspect of the 1st MPJ left a longitudinal linear incision was created immediately upon making incision the extensor hallucis time tendon was encountered this was retracted medially and protected further dissection down to the  level of bone displayed significant arthritic spurring and degenerative changes encompassing the dorsal medial and lateral aspect of the head of the 1st metatarsal.  Degenerative arthritic spurs were resected utilizing a sagittal saw this includes the dorsal medial and lateral aspect of the 1st metatarsal a power rasp was then used to re- shape and contour the head of the 1st metatarsal which after these bone spurs had been removed there was increased range of motion noted at the 1st MPJ left.  The area was thoroughly flushed irrigated with copious amounts of sterile saline bone wax was utilized in the areas where the spurs had been resected to control bone bleeding and regrowth of spurs closure was achieved with 3.0 Vicryl in a continuous running fashion intermediate closure was achieved with 4.0 Vicryl in a simple interrupted fashion and skin closure was achieved with 4.0 Monocryl in a subcuticular fashion patient was then injected with an additional 30 cc of 0.5% Marcaine plain masses all Steri-Strips were applied ankle tourniquet lowered removed minimal bleeding noted Adaptic nonadhesive dressing sterile 4x4s ABD multiple rolls of Kerlix and a lightly applied Ace bandage were placed overlying the area.  Patient will maintain limited weight-bearing status in a postoperative shoe and must ice and elevate and stay off of her foot as much as possible she is to keep the dressing dry and intact.  Follow up in the clinic next week.  This note was created using [employer] voice recognition software that occasionally misinterpreted phrases or words.

## 2020-07-24 NOTE — ANESTHESIA PREPROCEDURE EVALUATION
07/24/2020  Kim Porter is a 64 y.o., female.    Anesthesia Evaluation    I have reviewed the Patient Summary Reports.    I have reviewed the Nursing Notes.    I have reviewed the Medications.     Review of Systems  Anesthesia Hx:  No problems with previous Anesthesia  Neg history of prior surgery. Denies Family Hx of Anesthesia complications.   Denies Personal Hx of Anesthesia complications.   Social:  Smoker    Hematology/Oncology:  Hematology Normal   Oncology Normal     EENT/Dental:EENT/Dental Normal   Cardiovascular:   Hypertension, well controlled    Pulmonary:  Pulmonary Normal    Renal/:   Chronic Renal Disease, CRI    Hepatic/GI:  Hepatic/GI Normal    Musculoskeletal:   Arthritis     Neurological:  Neurology Normal    Endocrine:  Endocrine Normal    Dermatological:  Skin Normal    Psych:  Psychiatric Normal           Physical Exam   Airway/Jaw/Neck:  Airway Findings: Mouth Opening: Normal Tongue: Normal  General Airway Assessment: Adult  Mallampati: I  TM Distance: 4 - 6 cm                 Anesthesia Plan  Type of Anesthesia, risks & benefits discussed:  Anesthesia Type:  general  Patient's Preference:   Intra-op Monitoring Plan: standard ASA monitors  Intra-op Monitoring Plan Comments:   Post Op Pain Control Plan:   Post Op Pain Control Plan Comments:   Induction:   IV  Beta Blocker:  Patient is not currently on a Beta-Blocker (No further documentation required).       Informed Consent: Patient understands risks and agrees with Anesthesia plan.  Questions answered. Anesthesia consent signed with patient.  ASA Score: 2     Day of Surgery Review of History & Physical: I have interviewed and examined the patient. I have reviewed the patient's H&P dated:    H&P update referred to the provider.         Ready For Surgery From Anesthesia Perspective.

## 2020-07-24 NOTE — ANESTHESIA POSTPROCEDURE EVALUATION
Anesthesia Post Evaluation    Patient: Kim Porter    Procedure(s) Performed: Procedure(s) (LRB):  ARTHROPLASTY, FOOT (Left)    Final Anesthesia Type: general    Patient location during evaluation: PACU  Patient participation: Yes- Able to Participate  Level of consciousness: awake and awake and alert  Post-procedure vital signs: reviewed and stable  Pain management: adequate  Airway patency: patent    PONV status at discharge: No PONV  Anesthetic complications: no      Cardiovascular status: blood pressure returned to baseline  Respiratory status: unassisted and spontaneous ventilation  Hydration status: euvolemic  Follow-up not needed.          Vitals Value Taken Time   /68 07/24/20 1150   Temp 36.6 °C (97.8 °F) 07/24/20 1049   Pulse 95 07/24/20 1150   Resp 16 07/24/20 1150   SpO2 96 % 07/24/20 1150         Event Time   Out of Recovery 11:15:00         Pain/Nemesio Score: Nemesio Score: 10 (7/24/2020 11:35 AM)  Modified Nemesio Score: 19 (7/24/2020 11:50 AM)

## 2020-07-24 NOTE — DISCHARGE INSTRUCTIONS

## 2020-07-24 NOTE — BRIEF OP NOTE
[facility] - Periop Services  Brief Operative Note    Surgery Date: 7/24/2020     Surgeon(s) and Role:     * Romaine Muhammad DPM - Primary    Assisting Surgeon: None    Pre-op Diagnosis:  Hallux rigidus of left foot [M20.22]    Post-op Diagnosis:  Post-Op Diagnosis Codes:     * Hallux rigidus of left foot [M20.22]    Procedure(s) (LRB):  ARTHROPLASTY, FOOT (Left)    Anesthesia: Choice    Description of the findings of the procedure(s):     Estimated Blood Loss: * No values recorded between 7/24/2020 10:04 AM and 7/24/2020 10:47 AM *         Specimens:   Specimen (12h ago, onward)    None            Discharge Note    OUTCOME: Patient tolerated treatment/procedure well without complication and is now ready for discharge.    DISPOSITION: Home or Self Care    FINAL DIAGNOSIS:  <principal problem not specified>    FOLLOWUP: In clinic    DISCHARGE INSTRUCTIONS:    Discharge Procedure Orders   Keep surgical extremity elevated     Ice to affected area     Lifting restrictions     Weight bearing restrictions (specify):

## 2020-07-24 NOTE — PLAN OF CARE
PT TO RECOVERY FROM OR , PT CONNECTED TO MONITOR, IV INTACT,AND INFUSING, ,VITALS STABLEPT HAS DRESSING NOTED TO LEFT FOOT, ELEVATED ON PILLOW, ICE PACK APPLIED, WILL CONTINUE TO MONITOR

## 2020-07-24 NOTE — PLAN OF CARE
PT TOLERATING PO FLUIDS,POST OP SHOE GIVEN, PT VOICES UNDERSTANDING TO ONLY USE THIS WEEKEND TO GET UP AND GO TO BATHROOM

## 2020-07-27 ENCOUNTER — OFFICE VISIT (OUTPATIENT)
Dept: PODIATRY | Facility: CLINIC | Age: 65
End: 2020-07-27
Payer: MEDICARE

## 2020-07-27 VITALS
BODY MASS INDEX: 29.23 KG/M2 | HEIGHT: 63 IN | HEART RATE: 89 BPM | DIASTOLIC BLOOD PRESSURE: 93 MMHG | WEIGHT: 165 LBS | SYSTOLIC BLOOD PRESSURE: 178 MMHG | TEMPERATURE: 98 F

## 2020-07-27 DIAGNOSIS — M20.22 HALLUX RIGIDUS OF LEFT FOOT: Primary | ICD-10-CM

## 2020-07-27 PROCEDURE — 99024 POSTOP FOLLOW-UP VISIT: CPT | Mod: S$GLB,,, | Performed by: PODIATRIST

## 2020-07-27 PROCEDURE — 99024 PR POST-OP FOLLOW-UP VISIT: ICD-10-PCS | Mod: S$GLB,,, | Performed by: PODIATRIST

## 2020-07-27 PROCEDURE — 99999 PR PBB SHADOW E&M-EST. PATIENT-LVL IV: CPT | Mod: PBBFAC,,, | Performed by: PODIATRIST

## 2020-07-27 PROCEDURE — 99999 PR PBB SHADOW E&M-EST. PATIENT-LVL IV: ICD-10-PCS | Mod: PBBFAC,,, | Performed by: PODIATRIST

## 2020-07-27 NOTE — LETTER
August 1, 2020      Luann Clark, VEDA  [address] [facility] [place] [address]           [facility] - [facility]ry/[facility]ound [facility]  [address] [facility] [place] [address]  Phone: [phone]  Fax: [phone]          Patient: Kim Porter   MR Number: [id number]   YOB: 1955   Date of Visit: 7/27/2020       Dear Luann Clrak:    Thank you for referring Kim Porter to me for evaluation. Attached you will find relevant portions of my assessment and plan of care.    If you have questions, please do not hesitate to call me. I look forward to following Kim Porter along with you.    Sincerely,    Romaine Muhammad, LUIS FELIPE    Enclosure  CC:  No Recipients    If you would like to receive this communication electronically, please contact [email or web address] or [phone] to request more information on [employer] Link access.    For providers and/or their staff who would like to refer a patient to [facility], please contact us through our one-stop-shop provider referral line, [facility], at [phone].    If you feel you have received this communication in error or would no longer like to receive these types of communications, please e-mail [email or web address]

## 2020-08-02 NOTE — PROGRESS NOTES
"Kim Porter is a 64 y.o. female patient.   1. Hallux rigidus of left foot      Past Medical History:   Diagnosis Date    Chronic kidney disease     stones    Colon cancer     Hypertension      No past surgical history pertinent negatives on file.  Scheduled Meds:  Continuous Infusions:  PRN Meds:    Review of patient's allergies indicates:   Allergen Reactions    Vicodin [hydrocodone-acetaminophen] Nausea And Vomiting     There are no hospital problems to display for this patient.    Blood pressure (!) 178/93, pulse 89, temperature 97.7 °F (36.5 °C), height 5' 3" (1.6 m), weight 74.8 kg (165 lb).            Subjective  Objective   Assessment & Plan     Patient presents status post excision spurs surrounding the 1st MPJ of the patient's left foot secondary to hallux rigidus.  Patient is currently afebrile and in no acute distress she is taking the Bactrim as directed and states she has not had to take any pain medication she did not have any nausea and states that she is doing very well at this time.  Evaluation the surgical site looks great there is very limited edema noted less than would be considered normal with this surgery that the patient had.  New well-padded dry dressing was applied to the patient's left foot she was reminded to bear weight in her postoperative shoe she is to keep the dressing dry and intact it looks so good I am going to let the patient come follow-up in 2 weeks rather than 1 week I have recommended continued ice and elevation patient was in understanding and agreement with this today.  Patient indicated she actually has less of a bump on the big toe joint that she had prior to surgery even with the postoperative swelling.  Patient will call us with any questions or concerns prior to her scheduled follow-up.This note was created using [employer] voice recognition software that occasionally misinterpreted phrases or words.  Romaine Muhammad DPM  8/1/2020  "

## 2020-08-10 ENCOUNTER — TELEPHONE (OUTPATIENT)
Dept: PODIATRY | Facility: CLINIC | Age: 65
End: 2020-08-10

## 2020-08-10 ENCOUNTER — OFFICE VISIT (OUTPATIENT)
Dept: PODIATRY | Facility: CLINIC | Age: 65
End: 2020-08-10
Payer: MEDICARE

## 2020-08-10 VITALS
HEIGHT: 63 IN | BODY MASS INDEX: 29.23 KG/M2 | RESPIRATION RATE: 19 BRPM | TEMPERATURE: 98 F | OXYGEN SATURATION: 98 % | SYSTOLIC BLOOD PRESSURE: 147 MMHG | DIASTOLIC BLOOD PRESSURE: 87 MMHG | HEART RATE: 94 BPM | WEIGHT: 165 LBS

## 2020-08-10 DIAGNOSIS — M20.22 HALLUX RIGIDUS OF LEFT FOOT: Primary | ICD-10-CM

## 2020-08-10 PROCEDURE — 99024 PR POST-OP FOLLOW-UP VISIT: ICD-10-PCS | Mod: S$GLB,,, | Performed by: PODIATRIST

## 2020-08-10 PROCEDURE — 99999 PR PBB SHADOW E&M-EST. PATIENT-LVL IV: CPT | Mod: PBBFAC,,, | Performed by: PODIATRIST

## 2020-08-10 PROCEDURE — 99024 POSTOP FOLLOW-UP VISIT: CPT | Mod: S$GLB,,, | Performed by: PODIATRIST

## 2020-08-10 PROCEDURE — 99999 PR PBB SHADOW E&M-EST. PATIENT-LVL IV: ICD-10-PCS | Mod: PBBFAC,,, | Performed by: PODIATRIST

## 2020-08-10 RX ORDER — NYSTATIN 100000 U/G
OINTMENT TOPICAL
COMMUNITY
Start: 2020-08-04 | End: 2022-10-10

## 2020-08-10 RX ORDER — CARBAMAZEPINE 200 MG/1
TABLET ORAL
COMMUNITY
Start: 2020-08-02

## 2020-08-10 RX ORDER — ALPRAZOLAM 1 MG/1
TABLET ORAL
COMMUNITY
Start: 2020-08-04

## 2020-08-10 NOTE — LETTER
August 11, 2020      Luann Clark, VEDA  [address] [facility] [place] [address]           [facility] - [facility]ry/[facility]ound [facility]  [address] [facility] [place] [address]  Phone: [phone]  Fax: [phone]          Patient: Kim Porter   MR Number: [id number]   YOB: 1955   Date of Visit: 8/10/2020       Dear Luann Clark:    Thank you for referring Kim Porter to me for evaluation. Attached you will find relevant portions of my assessment and plan of care.    If you have questions, please do not hesitate to call me. I look forward to following Kim Porter along with you.    Sincerely,    Romaine Muhammad, LUIS FELIPE    Enclosure  CC:  No Recipients    If you would like to receive this communication electronically, please contact [email or web address] or [phone] to request more information on [employer] Link access.    For providers and/or their staff who would like to refer a patient to [facility], please contact us through our one-stop-shop provider referral line, [facility], at [phone].    If you feel you have received this communication in error or would no longer like to receive these types of communications, please e-mail [email or web address]

## 2020-08-10 NOTE — TELEPHONE ENCOUNTER
----- Message from Rachna Mooney sent at 8/10/2020 10:10 AM CDT -----  Type: Needs Medical Advice  Who Called:  Patient  Best Call Back Number:   Additional Information: Calling to find out if she can get her appointment today switched to a virtual visit as she has her grandchildren.

## 2020-08-10 NOTE — TELEPHONE ENCOUNTER
Advised patient per provider post op appointment would need to be in office. Patient also advised she could bring 4 year old to appointment with her per new guidelines. Patient verbalized understanding and will come to appt as scheduled.

## 2020-08-11 NOTE — PROGRESS NOTES
"        Kim Porter is a 64 y.o. female patient.   1. Hallux rigidus of left foot      Past Medical History:   Diagnosis Date    Chronic kidney disease     stones    Colon cancer     Hypertension      No past surgical history pertinent negatives on file.  Scheduled Meds:  Continuous Infusions:  PRN Meds:    Review of patient's allergies indicates:   Allergen Reactions    Vicodin [hydrocodone-acetaminophen] Nausea And Vomiting     There are no hospital problems to display for this patient.    Blood pressure (!) 147/87, pulse 94, temperature 98.2 °F (36.8 °C), temperature source Oral, resp. rate 19, height 5' 3" (1.6 m), weight 74.8 kg (165 lb), SpO2 98 %.            Subjective  Objective:  Vital signs (most recent): Blood pressure (!) 147/87, pulse 94, temperature 98.2 °F (36.8 °C), temperature source Oral, resp. rate 19, height 5' 3" (1.6 m), weight 74.8 kg (165 lb), SpO2 98 %.     Assessment & Plan     Patient presents status post excision spurs surrounding the 1st MPJ of the patient's left foot secondary to hallux rigidus.    Patient is doing very well she is not having any pain or discomfort her incision is healed very well there are no signs of infection she has minimal inflammation noted she does have increased range of motion noted at the 1st MPJ left.  I am going to allow the patient to transition to normal shoes as tolerated she needs to make sure that nothing rubs or irritates the area she understands that she is going to have some swelling some inflammation as she breaks down scar tissue in the surgical site but this will improve over time.  Patient can get the area wet she is not to apply any cream or lotion to the area over the next 7 days so that it can continue to heal I am releasing the patient following the surgical intervention certainly if she has any problems questions or concerns she can contact us for further evaluation.This note was created using [employer] voice recognition software " that occasionally misinterpreted phrases or words.  Romaine Muhammad DPM  8/11/2020

## 2022-10-10 ENCOUNTER — OFFICE VISIT (OUTPATIENT)
Dept: PODIATRY | Facility: CLINIC | Age: 67
End: 2022-10-10
Payer: MEDICARE

## 2022-10-10 VITALS
HEART RATE: 89 BPM | DIASTOLIC BLOOD PRESSURE: 77 MMHG | BODY MASS INDEX: 27.76 KG/M2 | SYSTOLIC BLOOD PRESSURE: 146 MMHG | WEIGHT: 156.69 LBS | HEIGHT: 63 IN

## 2022-10-10 DIAGNOSIS — M19.071 OSTEOARTHRITIS OF RIGHT ANKLE AND FOOT: Primary | ICD-10-CM

## 2022-10-10 DIAGNOSIS — L60.0 INGROWN NAIL: ICD-10-CM

## 2022-10-10 DIAGNOSIS — B35.1 ONYCHOMYCOSIS DUE TO DERMATOPHYTE: ICD-10-CM

## 2022-10-10 PROCEDURE — 99999 PR PBB SHADOW E&M-EST. PATIENT-LVL IV: CPT | Mod: PBBFAC,,, | Performed by: PODIATRIST

## 2022-10-10 PROCEDURE — 1160F RVW MEDS BY RX/DR IN RCRD: CPT | Mod: CPTII,S$GLB,, | Performed by: PODIATRIST

## 2022-10-10 PROCEDURE — 3077F PR MOST RECENT SYSTOLIC BLOOD PRESSURE >= 140 MM HG: ICD-10-PCS | Mod: CPTII,S$GLB,, | Performed by: PODIATRIST

## 2022-10-10 PROCEDURE — 3078F DIAST BP <80 MM HG: CPT | Mod: CPTII,S$GLB,, | Performed by: PODIATRIST

## 2022-10-10 PROCEDURE — 99213 PR OFFICE/OUTPT VISIT, EST, LEVL III, 20-29 MIN: ICD-10-PCS | Mod: S$GLB,,, | Performed by: PODIATRIST

## 2022-10-10 PROCEDURE — 1160F PR REVIEW ALL MEDS BY PRESCRIBER/CLIN PHARMACIST DOCUMENTED: ICD-10-PCS | Mod: CPTII,S$GLB,, | Performed by: PODIATRIST

## 2022-10-10 PROCEDURE — 3077F SYST BP >= 140 MM HG: CPT | Mod: CPTII,S$GLB,, | Performed by: PODIATRIST

## 2022-10-10 PROCEDURE — 3078F PR MOST RECENT DIASTOLIC BLOOD PRESSURE < 80 MM HG: ICD-10-PCS | Mod: CPTII,S$GLB,, | Performed by: PODIATRIST

## 2022-10-10 PROCEDURE — 1159F PR MEDICATION LIST DOCUMENTED IN MEDICAL RECORD: ICD-10-PCS | Mod: CPTII,S$GLB,, | Performed by: PODIATRIST

## 2022-10-10 PROCEDURE — 99213 OFFICE O/P EST LOW 20 MIN: CPT | Mod: S$GLB,,, | Performed by: PODIATRIST

## 2022-10-10 PROCEDURE — 3008F BODY MASS INDEX DOCD: CPT | Mod: CPTII,S$GLB,, | Performed by: PODIATRIST

## 2022-10-10 PROCEDURE — 99999 PR PBB SHADOW E&M-EST. PATIENT-LVL IV: ICD-10-PCS | Mod: PBBFAC,,, | Performed by: PODIATRIST

## 2022-10-10 PROCEDURE — 3008F PR BODY MASS INDEX (BMI) DOCUMENTED: ICD-10-PCS | Mod: CPTII,S$GLB,, | Performed by: PODIATRIST

## 2022-10-10 PROCEDURE — 1159F MED LIST DOCD IN RCRD: CPT | Mod: CPTII,S$GLB,, | Performed by: PODIATRIST

## 2022-10-10 RX ORDER — OXYBUTYNIN CHLORIDE 10 MG/1
TABLET, EXTENDED RELEASE ORAL
COMMUNITY
Start: 2022-01-07 | End: 2022-10-10 | Stop reason: SDUPTHER

## 2022-10-10 RX ORDER — DULOXETIN HYDROCHLORIDE 60 MG/1
CAPSULE, DELAYED RELEASE ORAL
COMMUNITY
Start: 2022-09-12 | End: 2022-10-10 | Stop reason: SDUPTHER

## 2022-10-10 RX ORDER — TIZANIDINE 4 MG/1
TABLET ORAL
COMMUNITY
Start: 2022-05-09

## 2022-10-10 RX ORDER — CICLOPIROX 80 MG/ML
SOLUTION TOPICAL NIGHTLY
Qty: 6.6 ML | Refills: 11 | Status: SHIPPED | OUTPATIENT
Start: 2022-10-10

## 2022-10-10 RX ORDER — ALPRAZOLAM 0.5 MG/1
0.5 TABLET ORAL
COMMUNITY
Start: 2022-10-05 | End: 2022-12-04

## 2022-10-10 RX ORDER — OMEPRAZOLE 40 MG/1
CAPSULE, DELAYED RELEASE ORAL
COMMUNITY
Start: 2022-10-04 | End: 2022-10-10 | Stop reason: SDUPTHER

## 2022-10-10 RX ORDER — MIRABEGRON 50 MG/1
50 TABLET, FILM COATED, EXTENDED RELEASE ORAL
COMMUNITY
Start: 2022-10-05

## 2022-10-10 RX ORDER — LOSARTAN POTASSIUM 25 MG/1
TABLET ORAL
COMMUNITY
Start: 2022-08-08 | End: 2022-10-10 | Stop reason: SDUPTHER

## 2022-10-11 NOTE — PROGRESS NOTES
Subjective:       Patient ID: Kim Porter is a 66 y.o. female.    Chief Complaint: Foot Pain (Right foot pain)    Patient presents today for follow-up she was last seen over 2 years ago she is complaining of pain on the top of her right foot it is especially bad by the end of the day she is also concerned about fungal infection in the 2nd digit left.  Foot Pain  Associated symptoms include arthralgias.   Follow-up  Associated symptoms include arthralgias.   Nail Problem  Associated symptoms include arthralgias.    Review of Systems   Musculoskeletal:  Positive for arthralgias.   All other systems reviewed and are negative.    Objective:      Physical Exam  Vitals and nursing note reviewed.   Constitutional:       Appearance: She is well-developed.   Cardiovascular:      Pulses:           Dorsalis pedis pulses are 1+ on the right side and 1+ on the left side.        Posterior tibial pulses are 1+ on the right side and 1+ on the left side.   Pulmonary:      Effort: Pulmonary effort is normal.   Musculoskeletal:         General: Swelling, tenderness and deformity present.      Right foot: Deformity present.      Left foot: Deformity present.        Feet:    Feet:      Right foot:      Protective Sensation: 4 sites tested.  3 sites sensed.      Skin integrity: Erythema present.      Left foot:      Protective Sensation: 4 sites tested.  3 sites sensed.      Toenail Condition: Left toenails are ingrown. Fungal disease present.  Skin:     General: Skin is warm.      Capillary Refill: Capillary refill takes more than 3 seconds.   Neurological:      Mental Status: She is alert.   Psychiatric:         Mood and Affect: Mood normal.         Behavior: Behavior normal.         Thought Content: Thought content normal.         Judgment: Judgment normal.                                 Assessment:       1. Osteoarthritis of right ankle and foot    2. Ingrown nail    3. Onychomycosis due to dermatophyte        Plan:        Patient presents today for follow-up she was last seen over 2 years ago she is complaining of pain on the top of her right foot it is especially bad by the end of the day she is also concerned about fungal infection in the 2nd digit left.  On evaluation patient has dorsal spurring noted on both feet I had previously removed spurring on the 1st MPJ left this areas doing great she is having some midfoot pain on the right due to the spurring in this area I have advised the patient certainly not getting enough support can aggravate this I did add a small blue arch pad to the patient's right shoe this should help to address the pain the patient is having in her right midfoot I also discussed the fungal toenail on the 2nd digit left this area was aggressively trimmed smooth down I have started the patient on prescription topical Penlac she will will apply this daily she understands this can take 12 months before she can see good results I explained the patient how to use this daily.  Recommended follow-up will be as needed.  This note was created using [employer] voice recognition software that occasionally misinterpreted phrases or words.

## 2024-04-25 DIAGNOSIS — M65.4 DE QUERVAIN'S TENOSYNOVITIS: Primary | ICD-10-CM

## 2024-05-02 NOTE — PROGRESS NOTES
"[facility]  Occupational Therapy Initial Evaluation      Name: Kim Porter  Clinic Number: [id number]    Therapy Diagnosis: No diagnosis found.  Physician: Andrews Holley PA    Physician Orders: Eval and Treat  Medical Diagnosis: M65.4 (ICD-10-CM) - De Quervain's tenosynovitis   Surgical Procedure and Date: None  Evaluation Date: 5/3/2024  Insurance Authorization Period Expiration: 4/25/25  Plan of Care Certification Period: 5/3/24-5/31/24  Date of Return to MD: Not yet scheduled  Visit # / Visits authorized: 1 / 1  FOTO: 1/ 3    Precautions:  Standard    Time In: 10:14 AM  Time Out: 10:58 AM  Total Billable Time: 44 minutes    Subjective      Date of Onset: March 2024    History of Current Condition/Mechanism of Injury: Kim reports: patient reported a history of right hand pain related to carpal tunnel syndrme beginning 20 years ago. She stated symptoms manifest for several days at a time, then dissipate and return intermittently. Patient stated her left wrist began bothering her in March 2024 while on a trip. Patient reported aching pain and occasional popping sensation in her left wrist. Patient purchased wrist brace to relieve pain with good outcome, but pain returns when patient doffs brace. She tends to carry or lift items using her left hand as she stated she may be guarding her right hand. Patient received a corticosteroid injection on 4/24/24 with great relief of pain. Patient is still wearing left thumb spica brace as she feels little to no pain while wearing it. She must remove it to engage in some ADL/IADL tasks due to limitations in range of motion. She reported neuropathy in bilateral feet, but she denied neuropathy in bilateral hands.     Falls: "occasionally" due to neuropathy in her feet    Involved Side: Left  Dominant Side: Right    Mechanism of Injury: No trauma noted  Surgical Procedure: None  Imaging:  None listed in medical record, patient stated she received " "X-ray    Prior Therapy: None    Pain:  Functional Pain Scale Rating 0-10:   2/10 on average  0/10 at best  3/10 at worst **patient noted great relief of symptoms following injection  Location: left CMC joint into volar wrist and hand   Description: Aching  Aggravating Factors: carrying items  Easing Factors:  thumb spica brace    Occupation:  part-time elementary school care  Working presently: part-time  Duties: check-in, snack time, interact with kids    Functional Limitations/Social History:    Previous functional status includes: Independent with all ADLs.     Current Functional Status   Home/Living environment: lives alone    - 1 story home, 3 steps to enter    - DME: none      Limitation of Functional Status as follows:   ADLs/IADLs:     - Feeding: patient reported no functional challenges.    - Bathing: patient reported no functional challenges.    - Dressing/Grooming: patient reported no functional challenges in grooming. Patient stated managing buttons is mildly uncomfortable, but she stated she does not wear many buttons on her clothing.     - Home Management: patient reported no functional challenges.    - Driving: patient reported sharp turns feel "awkward" wearing left thumb spica     Leisure: read, caring for grandkids, caring grandkids    Patient's Goals for Therapy: "to get it basically where it doesn't hurt"    Past Medical History/Physical Systems Review:   Kim Porter  has a past medical history of Chronic kidney disease, Colon cancer, and Hypertension.    Kim Porter  has a past surgical history that includes Tubal ligation; Portacath placement (9/2012); Hysterectomy; Oophorectomy; Cholecystectomy; Colon surgery; and Foot arthroplasty (Left, 7/24/2020).    Kim has a current medication list which includes the following prescription(s): alprazolam, carbamazepine, ciclopirox, duloxetine, lisinopril-hydrochlorothiazide, losartan, meloxicam, myrbetriq, multivitamin, omeprazole, oxybutynin, " rosuvastatin, and tizanidine.    Review of patient's allergies indicates:   Allergen Reactions    Ciprofloxacin Nausea And Vomiting    Vicodin [hydrocodone-acetaminophen] Nausea And Vomiting    Oxycodone-acetaminophen Nausea And Vomiting        Objective      Special tests  Finkelstein: Positive  Grind Test: Positive     Tenderness to palpation of left wrist first dorsal compartment    Left Upper Extremity Active Range of Motion Measurements  Pronation: 90  Supination: 90  Wrist Flexion: 75  Wrist Extension: 69  Radial Deviation: 15  Ulnar Deviation: 35    Right Upper Extremity Active Range of Motion Measurements  Pronation: 90  Supination: 90  Wrist Flexion: 85  Wrist Extension: 75  Radial Deviation: 20  Ulnar Deviation: 30    left Hand  Joint Evaluation  IF  5/3/2024 LF  5/3/2024 RF  5/3/2024 SF  5/3/2024   Flexion/Extension  AROM       MCP 80/0 75/0 75/0 90/0   /0 100/0 105/0 105/0   DIP 90/0 80/0 75/0 75/0   Thumb   MP 50/0  IP 80/0    right Hand  Joint Evaluation  IF  5/3/2024 LF  5/3/2024 RF  5/3/2024 SF  5/3/2024   Flexion/Extension  AROM       MCP 90/0 90/0 90/0 90/0   /0 100/0 105/0 105/0   DIP 70/0 70/0 70/0 70/0   Thumb  MP 30/0  IP 75/0    Hand Strength (JOAN Dynamometer, Setting II)   (lbs) Right   5/3/2024  Left   5/3/2024    1 50 36   2 52 35   3 51 34   Avg 5/3/2024 51 35   [norms for women aged 65-69: R=49.6 +/-9.7; L=41.0 +/-8.2 (Antonio et al, 1985)]    Pinch Right   5/3/2024  Left   5/3/2024    Lateral 9 9   Tip (2 point) 4 3   3 jaw debbie 4 4   [Lateral pinch norms for women aged 65-69: right: 15.0+/-2.6; left: 14.3+/-2.8 (Antonio et al, 1985)]   [2-point pinch norms for women aged 65-69: right: 10.6+/-2.0; left: 10.5+/-2.4 (Antonio et al, 1985)]   [3-point pinch norms for women aged 65-69: right: 14.2+/-3.1; left: 13.7+/-3.4 (Antonio et al, 1985)]     Intake Outcome Measure for FOTO Wrist Survey    Therapist reviewed FOTO scores for Kim Porter on 5/3/2024.    FOTO report - see Media section or FOTO account episode details.    Intake Score: 63%       Treatment      Total Treatment time (time-based codes) separate from Evaluation: 18 minutes    Kim received the treatments listed below:      therapeutic exercises to develop strength, endurance, ROM, and flexibility for 16 minutes including:  Patient performed left hand opposition for 2 minutes.  Patient performed left hand flexor tendon glides for 2 minutes.  Patient performed left wrist flexion with forearm supported using 1# weight for 2 minutes.  Patient performed left wrist extension with forearm supported using 1# weight for 2 minutes.  Patient performed left wrist radial deviation using 1# weight for 2 minutes.  Patient performed left hand digit extension using green digi-extend for 2 minute.s    therapeutic activities to improve functional performance for 2 minutes, including:  Patient performed left wrist juxtacisor for 2 minutes.    Patient Education and Home Exercises      Education provided:   -role of OT, goals for OT, scheduling/cancellations, insurance limitations with patient.  -Additional Education provided: home exercise program. Patient was educated on joint protection strategies to prevent irritation to the first dorsal compartment. These strategies include: good body mechanics and posture, avoid repetitive motions, avoid ulnar deviation, take frequent rest breaks, and modify activities that exacerbate symptoms. Patient verbalized and demonstrated understanding in therapeutic activities/exercises in session.     Written Home Exercises Provided: yes. Wrist flexion stretch, wrist extension stretch, radial deviation strengthening   Exercises were reviewed and Kim was able to demonstrate them prior to the end of the session.    Kim demonstrated good  understanding of the education provided.     Pt was advised to perform these exercises free of pain, and to stop performing them if pain occurs.    See  EMR under Patient Instructions for exercises provided 5/3/2024.    Assessment      Kim Porter is a 68 y.o. female referred to outpatient occupational therapy and presents with a medical diagnosis of M65.4 (ICD-10-CM) - De Quervain's tenosynovitis. Patient reported great relief of left wrist pain following corticosteroid injection and continuation of wear of left thumb spica. Patient demonstrated no limitations in range of motion, however, her left hand  strength is noted to be less than her right hand. Patient reported no functional limitations in ADL/IADL tasks, but she reported she occasionally experiences pain in these tasks, especially if she doffs brace. Patient demonstrated good understanding of education and home exercise program.     Following medical record review it is determined that pt will benefit from occupational therapy services in order to maximize pain free and/or functional use of left wrist. The following goals were discussed with the patient and patient is in agreement with them as to be addressed in the treatment plan. The patient's rehab potential is Good.     Anticipated barriers to occupational therapy: None    Plan of care discussed with patient: Yes  Patient's spiritual, cultural and educational needs considered and patient is agreeable to the plan of care and goals as stated below:     Medical Necessity is demonstrated by the following  Occupational Profile/History  Co-morbidities and personal factors that may impact the plan of care [x] LOW: Brief chart review  [] MODERATE: Expanded chart review   [] HIGH: Extensive chart review    Moderate / High Support Documentation:      Examination  Performance deficits relating to physical, cognitive or psychosocial skills that result in activity limitations and/or participation restrictions  [] LOW: addressing 1-3 Performance deficits  [x] MODERATE: 3-5 Performance deficits  [] HIGH: 5+ Performance deficits (please support  below)    Moderate / High Support Documentation:    Physical:  Muscle Power/Strength  Muscle Endurance   Strength  Pinch Strength  Pain    Cognitive:  No Deficits    Psychosocial:    No Deficits     Treatment Options [] LOW: Limited options  [x] MODERATE: Several options  [] HIGH: Multiple options      Decision Making/ Complexity Score: moderate       The following goals were discussed with the patient and patient is in agreement with them as to be addressed in the treatment plan.     Goals:   Short-term  Patient will demonstrate understanding of home exercise program by 5/17/24.  Patient will report 3 joint protection strategies to reduce strain to the left first dorsal compartment by 5/17/24.   Long-term  Patient will demonstrate increased functional use of left upper extremity in ADL/IADL tasks as evidenced by 15% increase in FOTO score by 5/31/24.  2.   Patient will report no difficulty managing fasteners while dressing by 5/31/24.  Patient will achieve >80% of right hand  strength in her left hand in order to increase safety and independence in ADL/IADL task performance by 5/31/24.    Plan     Plan of Care Certification: 5/3/2024 to 5/31/24.     Outpatient Occupational Therapy 1 times weekly for 4 weeks to include the following interventions: Manual therapy/joint mobilizations, Modalities for pain management, Therapeutic exercises/activities., Strengthening, and Joint Protection.    Alyssa Henderson OT    Physician's Signature: _________________________________________ Date: ________________

## 2024-05-03 ENCOUNTER — CLINICAL SUPPORT (OUTPATIENT)
Dept: REHABILITATION | Facility: HOSPITAL | Age: 69
End: 2024-05-03
Payer: MEDICARE

## 2024-05-03 DIAGNOSIS — M65.4 DE QUERVAIN'S TENOSYNOVITIS: ICD-10-CM

## 2024-05-03 DIAGNOSIS — R29.898 DECREASED GRIP STRENGTH OF LEFT HAND: ICD-10-CM

## 2024-05-03 DIAGNOSIS — Z78.9 IMPAIRED INSTRUMENTAL ACTIVITIES OF DAILY LIVING: Primary | ICD-10-CM

## 2024-05-03 PROCEDURE — 97110 THERAPEUTIC EXERCISES: CPT | Mod: PN

## 2024-05-03 PROCEDURE — 97166 OT EVAL MOD COMPLEX 45 MIN: CPT | Mod: PN

## 2024-05-03 NOTE — PLAN OF CARE
"[facility]  Occupational Therapy Initial Evaluation      Name: Kim Porter  Clinic Number: [id number]    Therapy Diagnosis: Impaired instrumental activities of daily living ICD-10-CM Z78.9 and decreased  strength of left hand ICD-10-CM R29.898  Physician: Andrews Holley PA    Physician Orders: Eval and Treat  Medical Diagnosis: M65.4 (ICD-10-CM) - De Quervain's tenosynovitis   Surgical Procedure and Date: None  Evaluation Date: 5/3/2024  Insurance Authorization Period Expiration: 4/25/25  Plan of Care Certification Period: 5/3/24-5/31/24  Date of Return to MD: Not yet scheduled  Visit # / Visits authorized: 1 / 1  FOTO: 1/ 3    Precautions:  Standard    Time In: 10:14 AM  Time Out: 10:58 AM  Total Billable Time: 44 minutes    Subjective      Date of Onset: March 2024    History of Current Condition/Mechanism of Injury: Kim reports: patient reported a history of right hand pain related to carpal tunnel syndrme beginning 20 years ago. She stated symptoms manifest for several days at a time, then dissipate and return intermittently. Patient stated her left wrist began bothering her in March 2024 while on a trip. Patient reported aching pain and occasional popping sensation in her left wrist. Patient purchased wrist brace to relieve pain with good outcome, but pain returns when patient doffs brace. She tends to carry or lift items using her left hand as she stated she may be guarding her right hand. Patient received a corticosteroid injection on 4/24/24 with great relief of pain. Patient is still wearing left thumb spica brace as she feels little to no pain while wearing it. She must remove it to engage in some ADL/IADL tasks due to limitations in range of motion. She reported neuropathy in bilateral feet, but she denied neuropathy in bilateral hands.     Falls: "occasionally" due to neuropathy in her feet    Involved Side: Left  Dominant Side: Right    Mechanism of Injury: No trauma " "noted  Surgical Procedure: None  Imaging:  None listed in medical record, patient stated she received X-ray    Prior Therapy: None    Pain:  Functional Pain Scale Rating 0-10:   2/10 on average  0/10 at best  3/10 at worst **patient noted great relief of symptoms following injection  Location: left CMC joint into volar wrist and hand   Description: Aching  Aggravating Factors: carrying items  Easing Factors: thumb spica brace    Occupation:  part-time elementary school care  Working presently: part-time  Duties: check-in, snack time, interact with kids    Functional Limitations/Social History:    Previous functional status includes: Independent with all ADLs.     Current Functional Status   Home/Living environment: lives alone    - 1 story home, 3 steps to enter    - DME: none      Limitation of Functional Status as follows:   ADLs/IADLs:     - Feeding: patient reported no functional challenges.    - Bathing: patient reported no functional challenges.    - Dressing/Grooming: patient reported no functional challenges in grooming. Patient stated managing buttons is mildly uncomfortable, but she stated she does not wear many buttons on her clothing.     - Home Management: patient reported no functional challenges.    - Driving: patient reported sharp turns feel "awkward" wearing left thumb spica     Leisure: read, caring for grandkids, caring grandkids    Patient's Goals for Therapy: "to get it basically where it doesn't hurt"    Past Medical History/Physical Systems Review:   Kim Porter  has a past medical history of Chronic kidney disease, Colon cancer, and Hypertension.    Kim Porter  has a past surgical history that includes Tubal ligation; Portacath placement (9/2012); Hysterectomy; Oophorectomy; Cholecystectomy; Colon surgery; and Foot arthroplasty (Left, 7/24/2020).    Kim has a current medication list which includes the following prescription(s): alprazolam, carbamazepine, ciclopirox, duloxetine, " lisinopril-hydrochlorothiazide, losartan, meloxicam, myrbetriq, multivitamin, omeprazole, oxybutynin, rosuvastatin, and tizanidine.    Review of patient's allergies indicates:   Allergen Reactions    Ciprofloxacin Nausea And Vomiting    Vicodin [hydrocodone-acetaminophen] Nausea And Vomiting    Oxycodone-acetaminophen Nausea And Vomiting        Objective      Special tests  Finkelstein: Positive  Grind Test: Positive     Tenderness to palpation of left wrist first dorsal compartment    Left Upper Extremity Active Range of Motion Measurements  Pronation: 90  Supination: 90  Wrist Flexion: 75  Wrist Extension: 69  Radial Deviation: 15  Ulnar Deviation: 35    Right Upper Extremity Active Range of Motion Measurements  Pronation: 90  Supination: 90  Wrist Flexion: 85  Wrist Extension: 75  Radial Deviation: 20  Ulnar Deviation: 30    left Hand  Joint Evaluation  IF  5/3/2024 LF  5/3/2024 RF  5/3/2024 SF  5/3/2024   Flexion/Extension  AROM       MCP 80/0 75/0 75/0 90/0   /0 100/0 105/0 105/0   DIP 90/0 80/0 75/0 75/0   Thumb   MP 50/0  IP 80/0    right Hand  Joint Evaluation  IF  5/3/2024 LF  5/3/2024 RF  5/3/2024 SF  5/3/2024   Flexion/Extension  AROM       MCP 90/0 90/0 90/0 90/0   /0 100/0 105/0 105/0   DIP 70/0 70/0 70/0 70/0   Thumb  MP 30/0  IP 75/0    Hand Strength (JOAN Dynamometer, Setting II)   (lbs) Right   5/3/2024  Left   5/3/2024    1 50 36   2 52 35   3 51 34   Avg 5/3/2024 51 35   [norms for women aged 65-69: R=49.6 +/-9.7; L=41.0 +/-8.2 (Tamytz et al, 1985)]    Pinch Right   5/3/2024  Left   5/3/2024    Lateral 9 9   Tip (2 point) 4 3   3 jaw debbie 4 4   [Lateral pinch norms for women aged 65-69: right: 15.0+/-2.6; left: 14.3+/-2.8 (Antonio et al, 1985)]   [2-point pinch norms for women aged 65-69: right: 10.6+/-2.0; left: 10.5+/-2.4 (Antonio et al, 1985)]   [3-point pinch norms for women aged 65-69: right: 14.2+/-3.1; left: 13.7+/-3.4 (Antonio et al, 1985)]     Intake  Outcome Measure for FOTO Wrist Survey    Therapist reviewed FOTO scores for Kim Porter on 5/3/2024.   FOTO report - see Media section or FOTO account episode details.    Intake Score: 63%       Treatment      Total Treatment time (time-based codes) separate from Evaluation: 18 minutes    Kim received the treatments listed below:      therapeutic exercises to develop strength, endurance, ROM, and flexibility for 16 minutes including:  Patient performed left hand opposition for 2 minutes.  Patient performed left hand flexor tendon glides for 2 minutes.  Patient performed left wrist flexion with forearm supported using 1# weight for 2 minutes.  Patient performed left wrist extension with forearm supported using 1# weight for 2 minutes.  Patient performed left wrist radial deviation using 1# weight for 2 minutes.  Patient performed left hand digit extension using green digi-extend for 2 minute.s    therapeutic activities to improve functional performance for 2 minutes, including:  Patient performed left wrist juxtacisor for 2 minutes.    Patient Education and Home Exercises      Education provided:   -role of OT, goals for OT, scheduling/cancellations, insurance limitations with patient.  -Additional Education provided: home exercise program. Patient was educated on joint protection strategies to prevent irritation to the first dorsal compartment. These strategies include: good body mechanics and posture, avoid repetitive motions, avoid ulnar deviation, take frequent rest breaks, and modify activities that exacerbate symptoms. Patient verbalized and demonstrated understanding in therapeutic activities/exercises in session.     Written Home Exercises Provided: yes. Wrist flexion stretch, wrist extension stretch, radial deviation strengthening   Exercises were reviewed and Kim was able to demonstrate them prior to the end of the session.    Kim demonstrated good  understanding of the education provided.     Pt  was advised to perform these exercises free of pain, and to stop performing them if pain occurs.    See EMR under Patient Instructions for exercises provided 5/3/2024.    Assessment      Kim Porter is a 68 y.o. female referred to outpatient occupational therapy and presents with a medical diagnosis of M65.4 (ICD-10-CM) - De Quervain's tenosynovitis. Patient reported great relief of left wrist pain following corticosteroid injection and continuation of wear of left thumb spica. Patient demonstrated no limitations in range of motion, however, her left hand  strength is noted to be less than her right hand. Patient reported no functional limitations in ADL/IADL tasks, but she reported she occasionally experiences pain in these tasks, especially if she doffs brace. Patient demonstrated good understanding of education and home exercise program.     Following medical record review it is determined that pt will benefit from occupational therapy services in order to maximize pain free and/or functional use of left wrist. The following goals were discussed with the patient and patient is in agreement with them as to be addressed in the treatment plan. The patient's rehab potential is Good.     Anticipated barriers to occupational therapy: None    Plan of care discussed with patient: Yes  Patient's spiritual, cultural and educational needs considered and patient is agreeable to the plan of care and goals as stated below:     Medical Necessity is demonstrated by the following  Occupational Profile/History  Co-morbidities and personal factors that may impact the plan of care [x] LOW: Brief chart review  [] MODERATE: Expanded chart review   [] HIGH: Extensive chart review    Moderate / High Support Documentation:      Examination  Performance deficits relating to physical, cognitive or psychosocial skills that result in activity limitations and/or participation restrictions  [] LOW: addressing 1-3 Performance  deficits  [x] MODERATE: 3-5 Performance deficits  [] HIGH: 5+ Performance deficits (please support below)    Moderate / High Support Documentation:    Physical:  Muscle Power/Strength  Muscle Endurance   Strength  Pinch Strength  Pain    Cognitive:  No Deficits    Psychosocial:    No Deficits     Treatment Options [] LOW: Limited options  [x] MODERATE: Several options  [] HIGH: Multiple options      Decision Making/ Complexity Score: moderate       The following goals were discussed with the patient and patient is in agreement with them as to be addressed in the treatment plan.     Goals:   Short-term  Patient will demonstrate understanding of home exercise program by 5/17/24.  Patient will report 3 joint protection strategies to reduce strain to the left first dorsal compartment by 5/17/24.   Long-term  Patient will demonstrate increased functional use of left upper extremity in ADL/IADL tasks as evidenced by 15% increase in FOTO score by 5/31/24.  2.   Patient will report no difficulty managing fasteners while dressing by 5/31/24.  Patient will achieve >80% of right hand  strength in her left hand in order to increase safety and independence in ADL/IADL task performance by 5/31/24.    Plan     Plan of Care Certification: 5/3/2024 to 5/31/24.     Outpatient Occupational Therapy 1 times weekly for 4 weeks to include the following interventions: Manual therapy/joint mobilizations, Modalities for pain management, Therapeutic exercises/activities., Strengthening, and Joint Protection.    Alyssa Henderson OT    Physician's Signature: _________________________________________ Date: ________________

## 2024-07-13 ENCOUNTER — HOSPITAL ENCOUNTER (EMERGENCY)
Facility: HOSPITAL | Age: 69
Discharge: HOME | End: 2024-07-13
Attending: STUDENT IN AN ORGANIZED HEALTH CARE EDUCATION/TRAINING PROGRAM
Payer: MEDICARE

## 2024-07-13 VITALS
HEART RATE: 82 BPM | WEIGHT: 160 LBS | OXYGEN SATURATION: 97 % | BODY MASS INDEX: 29.44 KG/M2 | DIASTOLIC BLOOD PRESSURE: 82 MMHG | HEIGHT: 62 IN | TEMPERATURE: 98 F | SYSTOLIC BLOOD PRESSURE: 175 MMHG | RESPIRATION RATE: 24 BRPM

## 2024-07-13 DIAGNOSIS — R42 DIZZINESS: ICD-10-CM

## 2024-07-13 DIAGNOSIS — R29.818 ACUTE FOCAL NEUROLOGICAL DEFICIT: ICD-10-CM

## 2024-07-13 DIAGNOSIS — R55 POSTURAL DIZZINESS WITH NEAR SYNCOPE: Primary | ICD-10-CM

## 2024-07-13 DIAGNOSIS — R42 POSTURAL DIZZINESS WITH NEAR SYNCOPE: Primary | ICD-10-CM

## 2024-07-13 DIAGNOSIS — R07.9 CHEST PAIN: ICD-10-CM

## 2024-07-13 LAB
ALBUMIN SERPL BCP-MCNC: 4.3 G/DL (ref 3.5–5.2)
ALP SERPL-CCNC: 91 U/L (ref 55–135)
ALT SERPL W/O P-5'-P-CCNC: 17 U/L (ref 10–44)
ANION GAP SERPL CALC-SCNC: 16 MMOL/L (ref 8–16)
AST SERPL-CCNC: 25 U/L (ref 10–40)
BASOPHILS # BLD AUTO: 0.05 K/UL (ref 0–0.2)
BASOPHILS NFR BLD: 0.4 % (ref 0–1.9)
BILIRUB SERPL-MCNC: 0.4 MG/DL (ref 0.1–1)
BILIRUB UR QL STRIP: NEGATIVE
BNP SERPL-MCNC: 28 PG/ML (ref 0–99)
BUN SERPL-MCNC: 26 MG/DL (ref 8–23)
CALCIUM SERPL-MCNC: 10.3 MG/DL (ref 8.7–10.5)
CHLORIDE SERPL-SCNC: 102 MMOL/L (ref 95–110)
CHOLEST SERPL-MCNC: 184 MG/DL (ref 120–199)
CHOLEST/HDLC SERPL: 2.5 {RATIO} (ref 2–5)
CLARITY UR: CLEAR
CO2 SERPL-SCNC: 23 MMOL/L (ref 23–29)
COLOR UR: YELLOW
CREAT SERPL-MCNC: 1.3 MG/DL (ref 0.5–1.4)
DIFFERENTIAL METHOD BLD: ABNORMAL
EOSINOPHIL # BLD AUTO: 0 K/UL (ref 0–0.5)
EOSINOPHIL NFR BLD: 0.1 % (ref 0–8)
ERYTHROCYTE [DISTWIDTH] IN BLOOD BY AUTOMATED COUNT: 13.8 % (ref 11.5–14.5)
EST. GFR  (NO RACE VARIABLE): 45.4 ML/MIN/1.73 M^2
GLUCOSE SERPL-MCNC: 127 MG/DL (ref 70–110)
GLUCOSE UR QL STRIP: NEGATIVE
HCT VFR BLD AUTO: 41.6 % (ref 37–48.5)
HDLC SERPL-MCNC: 74 MG/DL (ref 40–75)
HDLC SERPL: 40.2 % (ref 20–50)
HGB BLD-MCNC: 14 G/DL (ref 12–16)
HGB UR QL STRIP: ABNORMAL
IMM GRANULOCYTES # BLD AUTO: 0.05 K/UL (ref 0–0.04)
IMM GRANULOCYTES NFR BLD AUTO: 0.4 % (ref 0–0.5)
INR PPP: 1 (ref 0.8–1.2)
KETONES UR QL STRIP: NEGATIVE
LDLC SERPL CALC-MCNC: 81.8 MG/DL (ref 63–159)
LEUKOCYTE ESTERASE UR QL STRIP: NEGATIVE
LYMPHOCYTES # BLD AUTO: 1.7 K/UL (ref 1–4.8)
LYMPHOCYTES NFR BLD: 12.3 % (ref 18–48)
MCH RBC QN AUTO: 30.2 PG (ref 27–31)
MCHC RBC AUTO-ENTMCNC: 33.7 G/DL (ref 32–36)
MCV RBC AUTO: 90 FL (ref 82–98)
MONOCYTES # BLD AUTO: 0.7 K/UL (ref 0.3–1)
MONOCYTES NFR BLD: 5.2 % (ref 4–15)
NEUTROPHILS # BLD AUTO: 11.3 K/UL (ref 1.8–7.7)
NEUTROPHILS NFR BLD: 81.6 % (ref 38–73)
NITRITE UR QL STRIP: NEGATIVE
NONHDLC SERPL-MCNC: 110 MG/DL
NRBC BLD-RTO: 0 /100 WBC
PH UR STRIP: 6 [PH] (ref 5–8)
PLATELET # BLD AUTO: 365 K/UL (ref 150–450)
PMV BLD AUTO: 9.4 FL (ref 9.2–12.9)
POCT GLUCOSE: 120 MG/DL (ref 70–110)
POTASSIUM SERPL-SCNC: 4.1 MMOL/L (ref 3.5–5.1)
PROT SERPL-MCNC: 8.1 G/DL (ref 6–8.4)
PROT UR QL STRIP: ABNORMAL
PROTHROMBIN TIME: 10.6 SEC (ref 9–12.5)
RBC # BLD AUTO: 4.63 M/UL (ref 4–5.4)
SODIUM SERPL-SCNC: 141 MMOL/L (ref 136–145)
SP GR UR STRIP: <=1.005 (ref 1–1.03)
TRIGL SERPL-MCNC: 141 MG/DL (ref 30–150)
TROPONIN I SERPL DL<=0.01 NG/ML-MCNC: <0.006 NG/ML (ref 0–0.03)
TROPONIN I SERPL DL<=0.01 NG/ML-MCNC: <0.006 NG/ML (ref 0–0.03)
TSH SERPL DL<=0.005 MIU/L-ACNC: 1.18 UIU/ML (ref 0.4–4)
URN SPEC COLLECT METH UR: ABNORMAL
UROBILINOGEN UR STRIP-ACNC: NEGATIVE EU/DL
WBC # BLD AUTO: 13.86 K/UL (ref 3.9–12.7)

## 2024-07-13 PROCEDURE — 96361 HYDRATE IV INFUSION ADD-ON: CPT

## 2024-07-13 PROCEDURE — 99285 EMERGENCY DEPT VISIT HI MDM: CPT | Mod: 25

## 2024-07-13 PROCEDURE — 25500020 PHARM REV CODE 255: Performed by: STUDENT IN AN ORGANIZED HEALTH CARE EDUCATION/TRAINING PROGRAM

## 2024-07-13 PROCEDURE — 83880 ASSAY OF NATRIURETIC PEPTIDE: CPT | Performed by: STUDENT IN AN ORGANIZED HEALTH CARE EDUCATION/TRAINING PROGRAM

## 2024-07-13 PROCEDURE — 70498 CT ANGIOGRAPHY NECK: CPT | Mod: 26,,, | Performed by: RADIOLOGY

## 2024-07-13 PROCEDURE — 70496 CT ANGIOGRAPHY HEAD: CPT | Mod: 26,,, | Performed by: RADIOLOGY

## 2024-07-13 PROCEDURE — G0408 INPT/TELE FOLLOW UP 35: HCPCS | Mod: 95,,, | Performed by: STUDENT IN AN ORGANIZED HEALTH CARE EDUCATION/TRAINING PROGRAM

## 2024-07-13 PROCEDURE — 84443 ASSAY THYROID STIM HORMONE: CPT | Performed by: STUDENT IN AN ORGANIZED HEALTH CARE EDUCATION/TRAINING PROGRAM

## 2024-07-13 PROCEDURE — 70498 CT ANGIOGRAPHY NECK: CPT | Mod: TC

## 2024-07-13 PROCEDURE — 93005 ELECTROCARDIOGRAM TRACING: CPT

## 2024-07-13 PROCEDURE — 25000003 PHARM REV CODE 250: Mod: UD | Performed by: STUDENT IN AN ORGANIZED HEALTH CARE EDUCATION/TRAINING PROGRAM

## 2024-07-13 PROCEDURE — 84484 ASSAY OF TROPONIN QUANT: CPT | Performed by: STUDENT IN AN ORGANIZED HEALTH CARE EDUCATION/TRAINING PROGRAM

## 2024-07-13 PROCEDURE — 85025 COMPLETE CBC W/AUTO DIFF WBC: CPT | Performed by: STUDENT IN AN ORGANIZED HEALTH CARE EDUCATION/TRAINING PROGRAM

## 2024-07-13 PROCEDURE — 82962 GLUCOSE BLOOD TEST: CPT

## 2024-07-13 PROCEDURE — 85610 PROTHROMBIN TIME: CPT | Performed by: STUDENT IN AN ORGANIZED HEALTH CARE EDUCATION/TRAINING PROGRAM

## 2024-07-13 PROCEDURE — 81003 URINALYSIS AUTO W/O SCOPE: CPT | Performed by: STUDENT IN AN ORGANIZED HEALTH CARE EDUCATION/TRAINING PROGRAM

## 2024-07-13 PROCEDURE — 63600175 PHARM REV CODE 636 W HCPCS: Mod: UD | Performed by: STUDENT IN AN ORGANIZED HEALTH CARE EDUCATION/TRAINING PROGRAM

## 2024-07-13 PROCEDURE — 36415 COLL VENOUS BLD VENIPUNCTURE: CPT | Performed by: STUDENT IN AN ORGANIZED HEALTH CARE EDUCATION/TRAINING PROGRAM

## 2024-07-13 PROCEDURE — 96374 THER/PROPH/DIAG INJ IV PUSH: CPT

## 2024-07-13 PROCEDURE — 93010 ELECTROCARDIOGRAM REPORT: CPT | Mod: ,,, | Performed by: INTERNAL MEDICINE

## 2024-07-13 PROCEDURE — 80061 LIPID PANEL: CPT | Performed by: STUDENT IN AN ORGANIZED HEALTH CARE EDUCATION/TRAINING PROGRAM

## 2024-07-13 PROCEDURE — 80053 COMPREHEN METABOLIC PANEL: CPT | Performed by: STUDENT IN AN ORGANIZED HEALTH CARE EDUCATION/TRAINING PROGRAM

## 2024-07-13 PROCEDURE — 71045 X-RAY EXAM CHEST 1 VIEW: CPT | Mod: 26,,, | Performed by: RADIOLOGY

## 2024-07-13 PROCEDURE — 71045 X-RAY EXAM CHEST 1 VIEW: CPT | Mod: TC

## 2024-07-13 RX ORDER — ONDANSETRON HYDROCHLORIDE 2 MG/ML
4 INJECTION, SOLUTION INTRAVENOUS
Status: COMPLETED | OUTPATIENT
Start: 2024-07-13 | End: 2024-07-13

## 2024-07-13 RX ORDER — ONDANSETRON HYDROCHLORIDE 2 MG/ML
4 INJECTION, SOLUTION INTRAVENOUS
Status: DISCONTINUED | OUTPATIENT
Start: 2024-07-13 | End: 2024-07-13

## 2024-07-13 RX ADMIN — IOHEXOL 75 ML: 350 INJECTION, SOLUTION INTRAVENOUS at 03:07

## 2024-07-13 RX ADMIN — ONDANSETRON 4 MG: 2 INJECTION INTRAMUSCULAR; INTRAVENOUS at 04:07

## 2024-07-13 RX ADMIN — SODIUM CHLORIDE 1000 ML: 9 INJECTION, SOLUTION INTRAVENOUS at 04:07

## 2024-07-13 NOTE — DISCHARGE INSTRUCTIONS
Please contact your PCP, if the symptoms continue to occur you may need to wear something called a Holter monitor to see if you are having abnormal heart rate or rhythms.

## 2024-07-13 NOTE — SUBJECTIVE & OBJECTIVE
HPI:  68 y.o. female with history of rectal cancer, plantar fasciitis.  Was at work today when she felt hot and clammy all over, diaphoretic.  Was dizzy, weak and shaky all over.  On the way to the bathroom, she became incontinent.  Had multiple episodes of emesis as well.  No loss of consciousness    /119  Labs notable for leukocytosis.     Images personally reviewed and interpreted:  Study: Head CT and CTA Head & Neck  Study Interpretation:  No evidence of acute ischemia or hemorrhage.  Evidence of chronic microvascular disease present.  No evidence of intracranial occlusion.  Vertebral basilar system patent.     Assessment and plan:  Presents with multiple symptoms that do not clearly localize to a vascular territory.  Sounds like a presyncopal event although B/B incontinence is not typical of this.  Recommend toxic/metabolic/infectious workup and pursue workup for cardiogenic syncope including EKG and TTE.    Lytics recommendation: Thrombolytic therapy not recommended due to Suspected stroke mimic   Thrombectomy recommendation: No; at this time symptoms not suggestive of large vessel occlusion  Placement recommendation: pending further studies

## 2024-07-13 NOTE — ED PROVIDER NOTES
Encounter Date: 7/13/2024       History     Chief Complaint   Patient presents with    Fatigue     Patient reports nausea, vomiting, diarrhea, weakness and fatigue that started today.  Patient states she felt like she couldn't catch her breath when the symptoms started.  Was diaphoretic, did not take temperature.  Daughter further reports patient with disorientation and delayed responses prior to arrival.  Inability to hold urine and stool when vomiting.     HPI    Ms. Porter is a 68-year-old female with past medical history of hypertension, CKD, current tobacco use who presented after a near syncopal episode with diaphoresis, word-finding difficulty and confusion that happened while at work at [employer] around 11 30 today.  She did have episode of urine and stool incontinence after the episode as well as multiple episodes of vomiting.  Left work and went home to her son's house, daughter then brought her to the ED for evaluation.  On arrival she has no focal neuro deficits but still endorsing dizziness, confusion.  No reported seizure-like activity, no recent illness injury.  Stroke activated on arrival.      Review of patient's allergies indicates:   Allergen Reactions    Ciprofloxacin Nausea And Vomiting    Vicodin [hydrocodone-acetaminophen] Nausea And Vomiting    Oxycodone-acetaminophen Nausea And Vomiting     Past Medical History:   Diagnosis Date    Chronic kidney disease     stones    Colon cancer     Hypertension      Past Surgical History:   Procedure Laterality Date    CHOLECYSTECTOMY      COLON SURGERY      FOOT ARTHROPLASTY Left 7/24/2020    Procedure: ARTHROPLASTY, FOOT;  Surgeon: Romaine Muhammad DPM;  Location: [facility];  Service: Podiatry;  Laterality: Left;    HYSTERECTOMY      OOPHORECTOMY      PORTACATH PLACEMENT  9/2012    TUBAL LIGATION       Family History   Problem Relation Name Age of Onset    Ovarian cancer Sister  57    Cancer Sister      Cancer Mother      Breast cancer Mother       Stroke Father      Lung disease Father       Social History     Tobacco Use    Smoking status: Every Day     Current packs/day: 1.00     Average packs/day: 1 pack/day for 40.0 years (40.0 ttl pk-yrs)     Types: Cigarettes    Smokeless tobacco: Never   Substance Use Topics    Alcohol use: Yes    Drug use: Never     Review of Systems   Constitutional:  Positive for diaphoresis.   Gastrointestinal:  Positive for nausea.   Neurological:  Positive for dizziness, syncope, speech difficulty, weakness and light-headedness. Negative for facial asymmetry and headaches.       Physical Exam     Initial Vitals [07/13/24 1428]   BP Pulse Resp Temp SpO2   (!) 151/75 96 16 97.5 °F (36.4 °C) 100 %      MAP       --         Physical Exam    Nursing note and vitals reviewed.  Constitutional: She appears well-developed. She appears distressed.   HENT:   Head: Normocephalic and atraumatic.   Eyes: Pupils are equal, round, and reactive to light.   Cardiovascular:  Normal rate.           Pulmonary/Chest: No respiratory distress.   Abdominal: Abdomen is soft.     Neurological: She is oriented to person, place, and time.   Skin: Skin is warm.   Psychiatric: She has a normal mood and affect.       Current NIH Stroke Score Values      Flowsheet Row Most Recent Value   1a. Level of Consciousness 0-->Alert, keenly responsive   1b. LOC Questions 0-->Answers both questions correctly   1c. LOC Commands 0-->Performs both tasks correctly   2. Best Gaze 0-->Normal   3. Visual 0-->No visual loss   4. Facial Palsy 0-->Normal symmetrical movements   5a. Motor Arm, Left 0-->No drift, limb holds 90 (or 45) degrees for full 10 secs   5b. Motor Arm, Right 0-->No drift, limb holds 90 (or 45) degrees for full 10 secs   6a. Motor Leg, Left 0-->No drift, leg holds 30 degree position for full 5 secs   6b. Motor Leg, Right 0-->No drift, leg holds 30 degree position for full 5 secs   7. Limb Ataxia 0-->Absent   8. Sensory 0-->Normal, no sensory loss   9. Best  Language 0-->No aphasia, normal   10. Dysarthria 0-->Normal   11. Extinction and Inattention (formerly Neglect) 0-->No abnormality   Total (NIH Stroke Scale) 0                 ED Course   Critical Care    Date/Time: 7/13/2024 6:15 PM    Performed by: Elysia Olsen MD  Authorized by: Elysia Olsen MD  Direct patient critical care time: 5 minutes  Additional history critical care time: 5 minutes  Ordering / reviewing critical care time: 5 minutes  Documentation critical care time: 5 minutes  Consulting other physicians critical care time: 5 minutes  Consult with family critical care time: 5 minutes  Total critical care time (exclusive of procedural time) : 30 minutes  Critical care was necessary to treat or prevent imminent or life-threatening deterioration of the following conditions: toxidrome and CNS failure or compromise.  Critical care was time spent personally by me on the following activities: development of treatment plan with patient or surrogate, discussions with consultants, interpretation of cardiac output measurements, evaluation of patient's response to treatment, examination of patient, obtaining history from patient or surrogate, ordering and performing treatments and interventions, ordering and review of laboratory studies, ordering and review of radiographic studies, re-evaluation of patient's condition and review of old charts.        Labs Reviewed   CBC W/ AUTO DIFFERENTIAL - Abnormal; Notable for the following components:       Result Value    WBC 13.86 (*)     Gran # (ANC) 11.3 (*)     Immature Grans (Abs) 0.05 (*)     Gran % 81.6 (*)     Lymph % 12.3 (*)     All other components within normal limits   COMPREHENSIVE METABOLIC PANEL - Abnormal; Notable for the following components:    Glucose 127 (*)     BUN 26 (*)     eGFR 45.4 (*)     All other components within normal limits   URINALYSIS, REFLEX TO URINE CULTURE - Abnormal; Notable for the following components:    Specific  Gravity, UA <=1.005 (*)     Protein, UA Trace (*)     Occult Blood UA Trace (*)     All other components within normal limits    Narrative:     Preferred Collection Type->Urine, Clean Catch  Specimen Source->Urine   POCT GLUCOSE - Abnormal; Notable for the following components:    POCT Glucose 120 (*)     All other components within normal limits   PROTIME-INR   TSH   LIPID PANEL   TROPONIN I   B-TYPE NATRIURETIC PEPTIDE   TROPONIN I   POCT GLUCOSE MONITORING CONTINUOUS     EKG Readings: (Independently Interpreted)   EKG normal sinus rhythm at 91 beats per minutes, no ectopy, .       Imaging Results              X-Ray Chest AP Portable (Final result)  Result time 07/13/24 15:44:09      Final result by Faviola Torres MD (07/13/24 15:44:09)                   Impression:      No acute abnormality.      Electronically signed by: Faviola Torres MD  Date:    07/13/2024  Time:    15:44               Narrative:    EXAMINATION:  XR CHEST AP PORTABLE    CLINICAL HISTORY:  Stroke;    TECHNIQUE:  Single frontal view of the chest was performed.    COMPARISON:  07/21/2020    FINDINGS:  The lungs are clear, with normal appearance of pulmonary vasculature and no pleural effusion or pneumothorax.    The cardiac silhouette is normal in size. The hilar and mediastinal contours are unremarkable.    Bones are intact.                                       CTA Head and Neck (xpd) (Final result)  Result time 07/13/24 15:40:25      Final result by Faviola Torres MD (07/13/24 15:40:25)                   Impression:      There is no evidence acute intracranial process.  The major intracranial vessels are patent without evidence aneurysmal dilatation.      Electronically signed by: Faviola Torres MD  Date:    07/13/2024  Time:    15:40               Narrative:    EXAMINATION:  CTA HEAD AND NECK (XPD)    CLINICAL HISTORY:  Neuro deficit, acute, stroke suspected;concern for posterior CVA;    TECHNIQUE:  Non contrast low dose  axial images were obtained through the head.  CT angiogram was performed from the level of the bia to the top of the head following the IV administration of 75mL of Omnipaque 350.   Sagittal and coronal reconstructions and maximum intensity projection reconstructions were performed. Arterial stenosis percentages are based on NASCET measurement criteria.    COMPARISON:  None    FINDINGS:  There is no evidence acute intracranial process.  Specifically there is no evidence acute infarct, contusion, extra-axial fluid collection or midline shift.    There is atherosclerotic disease seen within the distal left common carotid artery resulting in less than 50% luminal narrowing.    The major intracranial vessels are patent without evidence aneurysmal dilatation.  Both vertebral arteries are patent without evidence stenosis.    There is a symmetric appearance of the orbits.  The adjacent soft tissues are unremarkable.  There is pulmonary emphysema.                                       Medications   iohexoL (OMNIPAQUE 350) injection 75 mL (75 mLs Intravenous Given 7/13/24 1511)   sodium chloride 0.9% bolus 1,000 mL 1,000 mL (0 mLs Intravenous Stopped 7/13/24 1754)   ondansetron injection 4 mg (4 mg Intravenous Given 7/13/24 1604)     Medical Decision Making  MDM    Assessment:  68-year-old female who presented after acute onset near syncopal episode with word-finding difficulties dizziness, confusion and loss of bowel and bladder control that occurred around 11 30 while at work.  No preceding prodrome or illness.  NIH 0, no focal deficits, Romberg normal.  Ddx:  Most concerning for posterior CVA, cardiogenic syncope electrolyte derangement, vasovagal, lower suspicion for  Infectious etiology  Workup:  Stroke and cardiac workup ordered, neurology consulted, do not feel this is an acute CVA.  CTA head and neck normal.  No MRI recommended.  Cardiac enzymes grossly normal, TSH normal, EKG without ischemic changes chest x-ray  normal.  Given IV fluids and Zofran with symptom improvement.  Troponin repeated for trend and remains undetectable.  Dispo:   Stable for ED discharge with close PCP follow-up.  Discussed need for possible Holter monitor, ED return precautions given.       Elysia Olsen MD  6:07 PM 07/13/2024             Amount and/or Complexity of Data Reviewed  Labs: ordered. Decision-making details documented in ED Course.  Radiology: ordered.    Risk  Prescription drug management.               ED Course as of 07/13/24 1817   Sat Jul 13, 2024   1507 WBC(!): 13.86 [CH]   1507 POCT Glucose(!): 120 [CH]   1542 Troponin I: <0.006 [CH]   1542 BNP: 28 [CH]   1752 Troponin I: <0.006 [CH]   1815 UA without signs of infection [CH]   1815 X-Ray Chest AP Portable  Chest x-ray reviewed, no focal opacities or effusions [CH]      ED Course User Index  [CH] Elysia Olsen MD                             Clinical Impression:  Final diagnoses:  [R07.9] Chest pain  [R29.818] Acute focal neurological deficit  [R42] Dizziness  [R42, R55] Postural dizziness with near syncope (Primary)          ED Disposition Condition    Discharge Stable          ED Prescriptions    None       Follow-up Information       Follow up With Specialties Details Why Contact Info    Luann Clark, FNP Family Medicine Schedule an appointment as soon as possible for a visit in 2 days For follow-up of hospitalization, Return to ED if symptoms worsen or re-occur [address] [facility] [place] [address] [phone]               Elysia Olsen MD  07/13/24 1817

## 2024-07-13 NOTE — TELEMEDICINE CONSULT
[facility]  Vascular Neurology  Comprehensive Stroke Center  TeleVascular Neurology Acute Consultation Note        Consult Information  Consult to Telemedicine - Acute Stroke  Consult performed by: Satish Muñiz MD  Consult ordered by: Stephy Olsen MD          Consulting Provider: STEPHY OLSEN   Current Providers  No providers found    Patient Location:  [facility] EMERGENCY DEPARTMENT Emergency Department    Spoke hospital nurse at bedside with patient assisting consultant.  Patient information was obtained from patient, past medical records, and ER records.       Stroke Documentation  Acute Stroke Times   Last Known Normal Date: 07/13/24  Last Known Normal Time: 1130  Stroke Team Called Date: 07/13/24  Stroke Team Called Time: 1502  Stroke Team Arrival Date: 07/13/24  CT Interpretation Time: 1518  CT completed but images not available for review - spoke to document results: 1512  Thrombolytic Therapy Recommended: No  CTA Interpretation Time: 1518  Thrombectomy Recommended: No    NIH Scale:  1a. Level of Consciousness: 0-->Alert, keenly responsive  1b. LOC Questions: 0-->Answers both questions correctly  1c. LOC Commands: 0-->Performs both tasks correctly  2. Best Gaze: 0-->Normal  3. Visual: 0-->No visual loss  4. Facial Palsy: 0-->Normal symmetrical movements  5a. Motor Arm, Left: 0-->No drift, limb holds 90 (or 45) degrees for full 10 secs  5b. Motor Arm, Right: 0-->No drift, limb holds 90 (or 45) degrees for full 10 secs  6a. Motor Leg, Left: 0-->No drift, leg holds 30 degree position for full 5 secs  6b. Motor Leg, Right: 0-->No drift, leg holds 30 degree position for full 5 secs  7. Limb Ataxia: 0-->Absent  8. Sensory: 0-->Normal, no sensory loss  9. Best Language: 0-->No aphasia, normal  10. Dysarthria: 0-->Normal  11. Extinction and Inattention (formerly Neglect): 0-->No abnormality  Total (NIH Stroke Scale): 0      Modified Wolfe: Score: 0  Ana Coma Scale:      ABCD2 Score:    [id number] Score:    HAS -BLED Score:    ICH Score:    Hunt & Galdamez Classification:      Blood pressure (!) 151/75, pulse 96, temperature 97.5 °F (36.4 °C), temperature source Oral, resp. rate 16, SpO2 100%.    Van Negative  In your opinion, this was a: Tier 1     Medical Decision Making  HPI:  68 y.o. female with history of rectal cancer, plantar fasciitis.  Was at work today when she felt hot and clammy all over, diaphoretic.  Was dizzy, weak and shaky all over.  On the way to the bathroom, she became incontinent.  Had multiple episodes of emesis as well.  No loss of consciousness    /119  Labs notable for leukocytosis.     Images personally reviewed and interpreted:  Study: Head CT and CTA Head & Neck  Study Interpretation:  No evidence of acute ischemia or hemorrhage.  Evidence of chronic microvascular disease present.  No evidence of intracranial occlusion.  Vertebral basilar system patent.     Assessment and plan:  Presents with multiple symptoms that do not clearly localize to a vascular territory.  Sounds like a presyncopal event although B/B incontinence is not typical of this.  Recommend toxic/metabolic/infectious workup and pursue workup for cardiogenic syncope including EKG and TTE.    Lytics recommendation: Thrombolytic therapy not recommended due to Suspected stroke mimic   Thrombectomy recommendation: No; at this time symptoms not suggestive of large vessel occlusion  Placement recommendation: pending further studies               ROS  Physical Exam  Neurological:      Mental Status: She is alert and oriented to person, place, and time.      Cranial Nerves: No dysarthria or facial asymmetry.      Motor: No weakness or pronator drift.      Coordination: Finger-Nose-Finger Test and Heel to Shin Test normal.       Past Medical History:   Diagnosis Date    Chronic kidney disease     stones    Colon cancer     Hypertension      Past Surgical History:   Procedure Laterality Date     CHOLECYSTECTOMY      COLON SURGERY      FOOT ARTHROPLASTY Left 7/24/2020    Procedure: ARTHROPLASTY, FOOT;  Surgeon: Romaine Muhammad DPM;  Location: [facility];  Service: Podiatry;  Laterality: Left;    HYSTERECTOMY      OOPHORECTOMY      PORTACATH PLACEMENT  9/2012    TUBAL LIGATION       Family History   Problem Relation Name Age of Onset    Ovarian cancer Sister  57    Cancer Sister      Cancer Mother      Breast cancer Mother      Stroke Father      Lung disease Father         Diagnoses  Problem Noted   Postural Dizziness With Presyncope 7/13/2024       Satish Muñiz MD      Emergent/Acute neurological consultation requested by spoke provider due to critical concerns for possible cerebrovascular event that could result in permanent loss of neurologic/bodily function, severe disability or death of this patient.  Immediate/timely evaluation by a highly prepared expert is paramount for optimal outcomes  High risk for neurological deterioration if not properly diagnosed  High risk for neurological deterioration if not treated promplty/as soon as possible  Complex diagnostic evaluation may be required (advanced imaging)  High risk treatment options (thrombolytics and/or thrombectomy)    Patient care was coordinated with spoke provider, including but not limted to    Discussing likely diagnosis/etiology of symptoms  Making recommendations for further diagnostic studies  Making recommendations for intravenous thrombolytics or other advanced therapies  Making recommendations for disposition (admission/transfer for higher level of care)

## 2024-07-13 NOTE — PROGRESS NOTES
STROKE-CT HEAD W/O & CTA HEAD & NECK DONE UNDER SAME ACC NUMBER      RN did not come with pt to CT; Sylvia brought pt to CT  1443 hrs code stroke called  1448 CTA head & neck ordered  1457 CT head w/o complete  1500 CTA head & neck complete

## 2024-07-15 LAB
OHS QRS DURATION: 78 MS
OHS QTC CALCULATION: 494 MS

## 2025-01-29 ENCOUNTER — OFFICE VISIT (OUTPATIENT)
Dept: PODIATRY | Facility: CLINIC | Age: 70
End: 2025-01-29
Payer: MEDICARE

## 2025-01-29 VITALS — WEIGHT: 172.81 LBS | BODY MASS INDEX: 31.8 KG/M2 | HEIGHT: 62 IN

## 2025-01-29 DIAGNOSIS — G60.9 IDIOPATHIC PERIPHERAL NEUROPATHY: Primary | ICD-10-CM

## 2025-01-29 DIAGNOSIS — M79.671 PAIN IN BOTH FEET: ICD-10-CM

## 2025-01-29 DIAGNOSIS — M20.5X1 HALLUX LIMITUS OF RIGHT FOOT: ICD-10-CM

## 2025-01-29 DIAGNOSIS — M79.672 PAIN IN BOTH FEET: ICD-10-CM

## 2025-01-29 PROCEDURE — 3008F BODY MASS INDEX DOCD: CPT | Mod: CPTII,S$GLB,, | Performed by: PODIATRIST

## 2025-01-29 PROCEDURE — 4010F ACE/ARB THERAPY RXD/TAKEN: CPT | Mod: CPTII,S$GLB,, | Performed by: PODIATRIST

## 2025-01-29 PROCEDURE — 3288F FALL RISK ASSESSMENT DOCD: CPT | Mod: CPTII,S$GLB,, | Performed by: PODIATRIST

## 2025-01-29 PROCEDURE — 99999 PR PBB SHADOW E&M-EST. PATIENT-LVL III: CPT | Mod: PBBFAC,,, | Performed by: PODIATRIST

## 2025-01-29 PROCEDURE — 1101F PT FALLS ASSESS-DOCD LE1/YR: CPT | Mod: CPTII,S$GLB,, | Performed by: PODIATRIST

## 2025-01-29 PROCEDURE — 1125F AMNT PAIN NOTED PAIN PRSNT: CPT | Mod: CPTII,S$GLB,, | Performed by: PODIATRIST

## 2025-01-29 PROCEDURE — 1159F MED LIST DOCD IN RCRD: CPT | Mod: CPTII,S$GLB,, | Performed by: PODIATRIST

## 2025-01-29 PROCEDURE — 1160F RVW MEDS BY RX/DR IN RCRD: CPT | Mod: CPTII,S$GLB,, | Performed by: PODIATRIST

## 2025-01-29 PROCEDURE — 99213 OFFICE O/P EST LOW 20 MIN: CPT | Mod: S$GLB,,, | Performed by: PODIATRIST

## 2025-01-29 NOTE — PROGRESS NOTES
"  [address] [facility] [address] [place] [address]  Phone: [phone]   [phone]    Patient's PCP:Luann Clark FNP  Referring Provider: No ref. provider found    Subjective:      Chief Complaint:: Peripheral Neuropathy (Bilateral ,worse in right )    LAKISHA Porter is a 69 y.o. female who presents today with a complaint of Bilateral ,worse in right. The current episode started about 2 months ago.  The symptoms include tinging numbness and a bone deep ache. Probable cause of complaint unknown.  The symptoms are aggravated by random . The problem has stayed the same. Treatment to date have included elevation which provided no relief.     Vitals:    01/29/25 1541   Weight: 78.4 kg (172 lb 13.5 oz)   Height: 5' 2" (1.575 m)   PainSc:   4      Shoe Size: 7.5 double wide    Past Surgical History:   Procedure Laterality Date    CHOLECYSTECTOMY      COLON SURGERY      FOOT ARTHROPLASTY Left 7/24/2020    Procedure: ARTHROPLASTY, FOOT;  Surgeon: Romaine Muhammad DPM;  Location: [facility] OR;  Service: Podiatry;  Laterality: Left;    HYSTERECTOMY      OOPHORECTOMY      PORTACATH PLACEMENT  9/2012    TUBAL LIGATION       Past Medical History:   Diagnosis Date    Chronic kidney disease     stones    Colon cancer     Hypertension      Family History   Problem Relation Name Age of Onset    Ovarian cancer Sister  57    Cancer Sister      Cancer Mother      Breast cancer Mother      Stroke Father      Lung disease Father          Social History:   Marital Status:   Alcohol History:  reports current alcohol use.  Tobacco History:  reports that she has been smoking cigarettes. She has a 40 pack-year smoking history. She has never used smokeless tobacco.  Drug History:  reports no history of drug use.    Review of patient's allergies indicates:   Allergen Reactions    Ciprofloxacin Nausea And Vomiting    Vicodin [hydrocodone-acetaminophen] Nausea And Vomiting    Oxycodone-acetaminophen Nausea And Vomiting     "   Current Outpatient Medications   Medication Sig Dispense Refill    ALPRAZolam (XANAX) 1 MG tablet       carBAMazepine (TEGRETOL) 200 mg tablet       DULoxetine (CYMBALTA) 60 MG capsule       losartan (COZAAR) 25 MG tablet TK 1 T PO QD  1    meloxicam (MOBIC) 15 MG tablet       mirabegron (MYRBETRIQ) 50 mg Tb24 50 mg.      multivitamin (THERAGRAN) per tablet Take 1 tablet by mouth once daily.      omeprazole (PRILOSEC) 40 MG capsule TK 1 C PO ONCE D  3    rosuvastatin (CRESTOR) 20 MG tablet       tiZANidine (ZANAFLEX) 4 MG tablet   = 1 tab, Oral, TID, # 90 tab, 0 Refill(s), Pharmacy: [id number], 160, cm, 04/26/22 8:32:00 CDT, Height/Length Measured, 74.2, kg, 01/11/22 10:58:00 CST, Weight Dosing      ciclopirox (PENLAC) 8 % Soln Apply topically nightly. 6.6 mL 11     No current facility-administered medications for this visit.       Review of Systems   Constitutional:  Negative for chills, fatigue, fever and unexpected weight change.   HENT:  Negative for hearing loss and trouble swallowing.    Eyes:  Negative for photophobia and visual disturbance.   Respiratory:  Negative for cough, shortness of breath and wheezing.    Cardiovascular:  Negative for chest pain, palpitations and leg swelling.   Gastrointestinal:  Negative for abdominal pain and nausea.   Genitourinary:  Negative for dysuria and frequency.   Musculoskeletal:  Negative for arthralgias, back pain, gait problem, joint swelling, myalgias and neck pain.   Skin:  Negative for rash and wound.   Neurological:  Positive for numbness. Negative for tremors, seizures, weakness and headaches.   Hematological:  Does not bruise/bleed easily.         Objective:        Physical Exam:   Foot Exam    General  General Appearance: appears stated age and healthy   Orientation: alert and oriented to person, place, and time   Affect: appropriate   Gait: unimpaired       Right Foot/Ankle     Inspection and Palpation  Ecchymosis: none  Tenderness: great toe  metatarsophalangeal joint (minimal)  Swelling: none   Arch: normal  Hallux limitus: yes (mild)  Skin Exam: skin intact; no drainage, no ulcer and no erythema   Neurovascular  Dorsalis pedis: 2+  Posterior tibial: 2+  Capillary Refill: 2+  Varicose veins: not present    Edema  Type of edema: non-pitting    Muscle Strength  Ankle dorsiflexion: 5  Ankle plantar flexion: 5  Ankle inversion: 5  Ankle eversion: 5  Great toe extension: 5  Great toe flexion: 5    Range of Motion    Normal right ankle ROM    Tests  Anterior drawer: negative   Talar tilt: negative   PT Tinel's sign: negative    Paresthesia: positive    Left Foot/Ankle      Inspection and Palpation  Ecchymosis: none  Tenderness: none   Swelling: none   Arch: normal  Skin Exam: skin intact; no drainage, no ulcer and no erythema   Neurovascular  Dorsalis pedis: 2+  Posterior tibial: 2+  Capillary refill: 2+  Varicose veins: not present    Edema  Type of edema: non-pitting    Muscle Strength  Ankle dorsiflexion: 5  Ankle plantar flexion: 5  Ankle inversion: 5  Ankle eversion: 5  Great toe extension: 5  Great toe flexion: 5    Range of Motion    Normal left ankle ROM    Tests  Anterior drawer: negative   Talar tilt: negative   PT Tinel's sign: negative  Paresthesia: positive      Physical Exam  Cardiovascular:      Pulses:           Dorsalis pedis pulses are 2+ on the right side and 2+ on the left side.        Posterior tibial pulses are 2+ on the right side and 2+ on the left side.   Feet:      Right foot:      Skin integrity: No ulcer or erythema.      Left foot:      Skin integrity: No ulcer or erythema.               Right Ankle/Foot Exam     Tenderness   The patient is tender to palpation of the great toe metatarsophalangeal joint.    Range of Motion   The patient has normal right ankle ROM.    Left Ankle/Foot Exam     Range of Motion   The patient has normal left ankle ROM.       Muscle Strength   Right Lower Extremity   Ankle Dorsiflexion:  5   Plantar  flexion:  5/5  Left Lower Extremity   Ankle Dorsiflexion:  5   Plantar flexion:  5/5     Reflexes     Left Side  Paresthesia: present    Right Side   Paresthesia: present    Vascular Exam     Right Pulses  Dorsalis Pedis:      2+  Posterior Tibial:      2+        Left Pulses  Dorsalis Pedis:      2+  Posterior Tibial:      2+           Imaging: none            Assessment:       1. Idiopathic peripheral neuropathy    2. Hallux limitus of right foot    3. Pain in both feet      Plan:   Idiopathic peripheral neuropathy    Hallux limitus of right foot    Pain in both feet      Follow up if symptoms worsen or fail to improve.    Procedures        I discussed with the patient that majority of the symptoms she is experiencing her feet are secondary to neuropathy.  We discussed the different etiologies of this.  I did explain that there are some treatments at the foot level which can help.  I do recommend B complex vitamin daily for nerve health.  We also discussed topical medications such as Voltaren which she can apply to the feet.  Also discussed with her that she has very minimal hallux limitus on the right foot.  We also discussed at length proper shoe gear.  I explained that a shoe with a wider toe box should limit pressure on her foot and help her symptoms significantly.  I made specific recommendations for different types of running shoes for her.    Counseling:     I provided patient education verbally regarding:   Patient diagnosis, treatment options, as well as alternatives, risks, and benefits.     This note was created using Dragon voice recognition software that occasionally misinterpreted phrases or words.

## (undated) DEVICE — GAUZE SPONGE 4X4 12PLY

## (undated) DEVICE — TOURNIQUET SB QC SP 18X4IN

## (undated) DEVICE — GLOVE BIOGEL PI ORTHO PRO 7.5

## (undated) DEVICE — BLADE SURG #15 CARBON STEEL

## (undated) DEVICE — SPONGE DERMACEA GAUZE 4X4

## (undated) DEVICE — NDL ECLIPSE SAFETY 25GX1IN

## (undated) DEVICE — PAD ABD 8X10 STERILE

## (undated) DEVICE — BLADE NARROW LONG 29.5MMX7.0MM

## (undated) DEVICE — SLEEVE SCD EXPRESS CALF MEDIUM

## (undated) DEVICE — SUT 3-0 VICRYL / SH (J416)

## (undated) DEVICE — COVER EQUIP 36X12 W/ELSTC BAND

## (undated) DEVICE — BLADE AGGR LRG TOOTH MED 31X9

## (undated) DEVICE — SUT 4-0 VICRYL / SH

## (undated) DEVICE — STRIP STERI REIN CLSR 1/2X2IN

## (undated) DEVICE — DRESSING N ADH OIL EMUL 3X3

## (undated) DEVICE — DRAPE MINI C-ARM

## (undated) DEVICE — SEE MEDLINE ITEM 156964

## (undated) DEVICE — SEE MEDLINE ITEM 146298

## (undated) DEVICE — SUT MONOCRYL 4-0 PS-2

## (undated) DEVICE — SEE MEDLINE ITEM 152522

## (undated) DEVICE — ADHESIVE MASTISOL VIAL 48/BX

## (undated) DEVICE — SEE MEDLINE ITEM 146270

## (undated) DEVICE — SUT BONE WAX 2.5 GRMS 12/BX

## (undated) DEVICE — PAD PREPS ALCOHOL 2-PLY LARGE

## (undated) DEVICE — ELECTRODE REM PLYHSV RETURN 9

## (undated) DEVICE — NDL ECLIPSE SAFETY 18GX1-1/2IN

## (undated) DEVICE — SOL 9P NACL IRR PIC IL

## (undated) DEVICE — CANISTER SUCTION 3000CC

## (undated) DEVICE — RASP PEN DRIVE LARGE 14X7MM